# Patient Record
Sex: FEMALE | Race: BLACK OR AFRICAN AMERICAN | Employment: FULL TIME | URBAN - METROPOLITAN AREA
[De-identification: names, ages, dates, MRNs, and addresses within clinical notes are randomized per-mention and may not be internally consistent; named-entity substitution may affect disease eponyms.]

---

## 2016-05-11 LAB
ALT SERPL-CCNC: 22 U/L (ref 0–69)
CREAT SERPL-MCNC: 0.39 MG/DL (ref 0.52–1.04)
GFR SERPL CREATININE-BSD FRML MDRD: >60 ML/MIN/1.73M2
GLUCOSE SERPL-MCNC: 228 MG/DL (ref 70–180)
HBA1C MFR BLD: 9.8 % (ref 4.3–5.6)
POTASSIUM SERPL-SCNC: 3.9 MMOL/L (ref 3.5–5.3)

## 2017-09-20 ENCOUNTER — TRANSFERRED RECORDS (OUTPATIENT)
Dept: HEALTH INFORMATION MANAGEMENT | Facility: CLINIC | Age: 32
End: 2017-09-20

## 2017-09-20 LAB
CREAT SERPL-MCNC: 0.46 MG/DL (ref 0.55–1.02)
GFR SERPL CREATININE-BSD FRML MDRD: >60 ML/MIN/1.73M2
HBA1C MFR BLD: 9.9 % (ref 4.3–5.6)
POTASSIUM SERPL-SCNC: 3.7 MMOL/L (ref 3.5–5.1)
TSH SERPL-ACNC: 23.82 UIU/ML (ref 0.3–4.5)

## 2019-12-05 ENCOUNTER — APPOINTMENT (OUTPATIENT)
Dept: CT IMAGING | Facility: CLINIC | Age: 34
DRG: 639 | End: 2019-12-05
Attending: EMERGENCY MEDICINE
Payer: COMMERCIAL

## 2019-12-05 ENCOUNTER — APPOINTMENT (OUTPATIENT)
Dept: ULTRASOUND IMAGING | Facility: CLINIC | Age: 34
DRG: 639 | End: 2019-12-05
Attending: EMERGENCY MEDICINE
Payer: COMMERCIAL

## 2019-12-05 ENCOUNTER — APPOINTMENT (OUTPATIENT)
Dept: GENERAL RADIOLOGY | Facility: CLINIC | Age: 34
DRG: 639 | End: 2019-12-05
Attending: EMERGENCY MEDICINE
Payer: COMMERCIAL

## 2019-12-05 ENCOUNTER — HOSPITAL ENCOUNTER (INPATIENT)
Facility: CLINIC | Age: 34
LOS: 3 days | Discharge: HOME | DRG: 639 | End: 2019-12-08
Attending: EMERGENCY MEDICINE | Admitting: INTERNAL MEDICINE
Payer: COMMERCIAL

## 2019-12-05 DIAGNOSIS — E86.0 DEHYDRATION: ICD-10-CM

## 2019-12-05 DIAGNOSIS — R16.0 LIVER MASS: ICD-10-CM

## 2019-12-05 DIAGNOSIS — R11.2 NON-INTRACTABLE VOMITING WITH NAUSEA, UNSPECIFIED VOMITING TYPE: ICD-10-CM

## 2019-12-05 DIAGNOSIS — E87.20 LACTIC ACIDOSIS: ICD-10-CM

## 2019-12-05 DIAGNOSIS — E03.9 HYPOTHYROIDISM, UNSPECIFIED TYPE: Primary | ICD-10-CM

## 2019-12-05 DIAGNOSIS — R10.11 RUQ ABDOMINAL PAIN: ICD-10-CM

## 2019-12-05 LAB
ALBUMIN SERPL-MCNC: 2.9 G/DL (ref 3.4–5)
ALBUMIN UR-MCNC: 100 MG/DL
ALP SERPL-CCNC: 68 U/L (ref 40–150)
ALT SERPL W P-5'-P-CCNC: 12 U/L (ref 0–50)
ANION GAP SERPL CALCULATED.3IONS-SCNC: 10 MMOL/L (ref 3–14)
ANION GAP SERPL CALCULATED.3IONS-SCNC: 10 MMOL/L (ref 6–17)
ANION GAP SERPL CALCULATED.3IONS-SCNC: 9 MMOL/L (ref 3–14)
ANION GAP SERPL CALCULATED.3IONS-SCNC: 9 MMOL/L (ref 3–14)
APPEARANCE UR: CLEAR
AST SERPL W P-5'-P-CCNC: 10 U/L (ref 0–45)
B-HCG FREE SERPL-ACNC: <5 IU/L
BASE DEFICIT BLDV-SCNC: 6.6 MMOL/L
BASE DEFICIT BLDV-SCNC: 8.3 MMOL/L
BASOPHILS # BLD AUTO: 0.1 10E9/L (ref 0–0.2)
BASOPHILS NFR BLD AUTO: 0.6 %
BILIRUB DIRECT SERPL-MCNC: <0.1 MG/DL (ref 0–0.2)
BILIRUB SERPL-MCNC: 0.2 MG/DL (ref 0.2–1.3)
BILIRUB UR QL STRIP: NEGATIVE
BUN SERPL-MCNC: 13 MG/DL (ref 7–30)
BUN SERPL-MCNC: 14 MG/DL (ref 5–24)
BUN SERPL-MCNC: 14 MG/DL (ref 7–30)
BUN SERPL-MCNC: 17 MG/DL (ref 7–30)
CA-I BLD-SCNC: 4.4 MG/DL (ref 4.4–5.2)
CALCIUM SERPL-MCNC: 7.9 MG/DL (ref 8.5–10.1)
CALCIUM SERPL-MCNC: 8 MG/DL (ref 8.5–10.1)
CALCIUM SERPL-MCNC: 8.7 MG/DL (ref 8.5–10.1)
CHLORIDE BLD-SCNC: 107 MMOL/L (ref 94–109)
CHLORIDE SERPL-SCNC: 104 MMOL/L (ref 94–109)
CHLORIDE SERPL-SCNC: 108 MMOL/L (ref 94–109)
CHLORIDE SERPL-SCNC: 108 MMOL/L (ref 94–109)
CO2 BLD-SCNC: 18 MMOL/L (ref 20–32)
CO2 BLDCOV-SCNC: 18 MMOL/L (ref 21–28)
CO2 SERPL-SCNC: 19 MMOL/L (ref 20–32)
CO2 SERPL-SCNC: 20 MMOL/L (ref 20–32)
COLOR UR AUTO: YELLOW
CREAT BLD-MCNC: 0.5 MG/DL (ref 0.52–1.04)
CREAT SERPL-MCNC: 0.53 MG/DL (ref 0.52–1.04)
CREAT SERPL-MCNC: 0.58 MG/DL (ref 0.52–1.04)
CREAT SERPL-MCNC: 0.66 MG/DL (ref 0.52–1.04)
DIFFERENTIAL METHOD BLD: ABNORMAL
EOSINOPHIL # BLD AUTO: 0 10E9/L (ref 0–0.7)
EOSINOPHIL NFR BLD AUTO: 0.3 %
ERYTHROCYTE [DISTWIDTH] IN BLOOD BY AUTOMATED COUNT: 16.9 % (ref 10–15)
GFR SERPL CREATININE-BSD FRML MDRD: >90 ML/MIN/{1.73_M2}
GLUCOSE BLD-MCNC: 240 MG/DL (ref 70–99)
GLUCOSE BLDC GLUCOMTR-MCNC: 125 MG/DL (ref 70–99)
GLUCOSE BLDC GLUCOMTR-MCNC: 171 MG/DL (ref 70–99)
GLUCOSE BLDC GLUCOMTR-MCNC: 185 MG/DL (ref 70–99)
GLUCOSE BLDC GLUCOMTR-MCNC: 290 MG/DL (ref 70–99)
GLUCOSE SERPL-MCNC: 158 MG/DL (ref 70–99)
GLUCOSE SERPL-MCNC: 190 MG/DL (ref 70–99)
GLUCOSE SERPL-MCNC: 304 MG/DL (ref 70–99)
GLUCOSE UR STRIP-MCNC: >1000 MG/DL
HBA1C MFR BLD: 9.6 % (ref 0–5.6)
HCO3 BLDV-SCNC: 18 MMOL/L (ref 21–28)
HCO3 BLDV-SCNC: 19 MMOL/L (ref 21–28)
HCT VFR BLD AUTO: 32.2 % (ref 35–47)
HCT VFR BLD CALC: 29 %PCV (ref 35–47)
HGB BLD CALC-MCNC: 9.9 G/DL (ref 11.7–15.7)
HGB BLD-MCNC: 9.2 G/DL (ref 11.7–15.7)
HGB UR QL STRIP: NEGATIVE
HYALINE CASTS #/AREA URNS LPF: 3 /LPF (ref 0–2)
IMM GRANULOCYTES # BLD: 0.1 10E9/L (ref 0–0.4)
IMM GRANULOCYTES NFR BLD: 0.6 %
KETONES BLD-SCNC: 0.9 MMOL/L (ref 0–0.6)
KETONES BLD-SCNC: 1.4 MMOL/L (ref 0–0.6)
KETONES UR STRIP-MCNC: 80 MG/DL
LACTATE BLD-SCNC: 2.6 MMOL/L (ref 0.7–2.1)
LEUKOCYTE ESTERASE UR QL STRIP: NEGATIVE
LIPASE SERPL-CCNC: 187 U/L (ref 73–393)
LYMPHOCYTES # BLD AUTO: 1.9 10E9/L (ref 0.8–5.3)
LYMPHOCYTES NFR BLD AUTO: 12.7 %
MAGNESIUM SERPL-MCNC: 1.3 MG/DL (ref 1.6–2.3)
MCH RBC QN AUTO: 19.5 PG (ref 26.5–33)
MCHC RBC AUTO-ENTMCNC: 28.6 G/DL (ref 31.5–36.5)
MCV RBC AUTO: 68 FL (ref 78–100)
MONOCYTES # BLD AUTO: 0.4 10E9/L (ref 0–1.3)
MONOCYTES NFR BLD AUTO: 2.4 %
MUCOUS THREADS #/AREA URNS LPF: PRESENT /LPF
NEUTROPHILS # BLD AUTO: 12.3 10E9/L (ref 1.6–8.3)
NEUTROPHILS NFR BLD AUTO: 83.4 %
NITRATE UR QL: NEGATIVE
NRBC # BLD AUTO: 0 10*3/UL
NRBC BLD AUTO-RTO: 0 /100
O2/TOTAL GAS SETTING VFR VENT: ABNORMAL %
O2/TOTAL GAS SETTING VFR VENT: ABNORMAL %
OSMOLALITY SERPL: 296 MMOL/KG (ref 275–295)
OXYHGB MFR BLDV: 59 %
OXYHGB MFR BLDV: 70 %
PCO2 BLDV: 39 MM HG (ref 40–50)
PCO2 BLDV: 40 MM HG (ref 40–50)
PCO2 BLDV: 40 MM HG (ref 40–50)
PH BLDV: 7.26 PH (ref 7.32–7.43)
PH BLDV: 7.28 PH (ref 7.32–7.43)
PH BLDV: 7.3 PH (ref 7.32–7.43)
PH UR STRIP: 6 PH (ref 5–7)
PHOSPHATE SERPL-MCNC: 3.2 MG/DL (ref 2.5–4.5)
PHOSPHATE SERPL-MCNC: 3.7 MG/DL (ref 2.5–4.5)
PLATELET # BLD AUTO: 505 10E9/L (ref 150–450)
PO2 BLDV: 25 MM HG (ref 25–47)
PO2 BLDV: 39 MM HG (ref 25–47)
PO2 BLDV: 44 MM HG (ref 25–47)
POTASSIUM BLD-SCNC: 4.3 MMOL/L (ref 3.4–5.3)
POTASSIUM SERPL-SCNC: 3.7 MMOL/L (ref 3.4–5.3)
POTASSIUM SERPL-SCNC: 4.1 MMOL/L (ref 3.4–5.3)
POTASSIUM SERPL-SCNC: 4.1 MMOL/L (ref 3.4–5.3)
PROT SERPL-MCNC: 7.6 G/DL (ref 6.8–8.8)
RBC # BLD AUTO: 4.71 10E12/L (ref 3.8–5.2)
RBC #/AREA URNS AUTO: 1 /HPF (ref 0–2)
SAO2 % BLDV FROM PO2: 38 %
SODIUM BLD-SCNC: 135 MMOL/L (ref 133–144)
SODIUM SERPL-SCNC: 133 MMOL/L (ref 133–144)
SODIUM SERPL-SCNC: 136 MMOL/L (ref 133–144)
SODIUM SERPL-SCNC: 137 MMOL/L (ref 133–144)
SOURCE: ABNORMAL
SP GR UR STRIP: 1.03 (ref 1–1.03)
SQUAMOUS #/AREA URNS AUTO: 2 /HPF (ref 0–1)
T4 FREE SERPL-MCNC: 1.47 NG/DL (ref 0.76–1.46)
TSH SERPL DL<=0.005 MIU/L-ACNC: 0.03 MU/L (ref 0.4–4)
UROBILINOGEN UR STRIP-MCNC: NORMAL MG/DL (ref 0–2)
WBC # BLD AUTO: 14.7 10E9/L (ref 4–11)
WBC #/AREA URNS AUTO: 3 /HPF (ref 0–5)

## 2019-12-05 PROCEDURE — 80048 BASIC METABOLIC PNL TOTAL CA: CPT | Performed by: INTERNAL MEDICINE

## 2019-12-05 PROCEDURE — 00000146 ZZHCL STATISTIC GLUCOSE BY METER IP

## 2019-12-05 PROCEDURE — 12000000 ZZH R&B MED SURG/OB

## 2019-12-05 PROCEDURE — 81001 URINALYSIS AUTO W/SCOPE: CPT | Performed by: EMERGENCY MEDICINE

## 2019-12-05 PROCEDURE — 71046 X-RAY EXAM CHEST 2 VIEWS: CPT

## 2019-12-05 PROCEDURE — 84100 ASSAY OF PHOSPHORUS: CPT | Performed by: INTERNAL MEDICINE

## 2019-12-05 PROCEDURE — 87040 BLOOD CULTURE FOR BACTERIA: CPT | Performed by: PHYSICIAN ASSISTANT

## 2019-12-05 PROCEDURE — 84702 CHORIONIC GONADOTROPIN TEST: CPT

## 2019-12-05 PROCEDURE — 36415 COLL VENOUS BLD VENIPUNCTURE: CPT | Performed by: INTERNAL MEDICINE

## 2019-12-05 PROCEDURE — 82805 BLOOD GASES W/O2 SATURATION: CPT | Performed by: EMERGENCY MEDICINE

## 2019-12-05 PROCEDURE — 25800030 ZZH RX IP 258 OP 636: Performed by: EMERGENCY MEDICINE

## 2019-12-05 PROCEDURE — 25000131 ZZH RX MED GY IP 250 OP 636 PS 637: Performed by: INTERNAL MEDICINE

## 2019-12-05 PROCEDURE — 83930 ASSAY OF BLOOD OSMOLALITY: CPT | Performed by: EMERGENCY MEDICINE

## 2019-12-05 PROCEDURE — 96376 TX/PRO/DX INJ SAME DRUG ADON: CPT

## 2019-12-05 PROCEDURE — 76705 ECHO EXAM OF ABDOMEN: CPT

## 2019-12-05 PROCEDURE — 36415 COLL VENOUS BLD VENIPUNCTURE: CPT | Performed by: PHYSICIAN ASSISTANT

## 2019-12-05 PROCEDURE — 83036 HEMOGLOBIN GLYCOSYLATED A1C: CPT | Performed by: EMERGENCY MEDICINE

## 2019-12-05 PROCEDURE — 80047 BASIC METABLC PNL IONIZED CA: CPT

## 2019-12-05 PROCEDURE — 99285 EMERGENCY DEPT VISIT HI MDM: CPT | Mod: 25

## 2019-12-05 PROCEDURE — 99223 1ST HOSP IP/OBS HIGH 75: CPT | Mod: AI | Performed by: PHYSICIAN ASSISTANT

## 2019-12-05 PROCEDURE — 80048 BASIC METABOLIC PNL TOTAL CA: CPT | Performed by: EMERGENCY MEDICINE

## 2019-12-05 PROCEDURE — 83735 ASSAY OF MAGNESIUM: CPT | Performed by: EMERGENCY MEDICINE

## 2019-12-05 PROCEDURE — 82010 KETONE BODYS QUAN: CPT | Performed by: PHYSICIAN ASSISTANT

## 2019-12-05 PROCEDURE — 25000132 ZZH RX MED GY IP 250 OP 250 PS 637: Performed by: PHYSICIAN ASSISTANT

## 2019-12-05 PROCEDURE — 84100 ASSAY OF PHOSPHORUS: CPT | Performed by: EMERGENCY MEDICINE

## 2019-12-05 PROCEDURE — 85025 COMPLETE CBC W/AUTO DIFF WBC: CPT | Performed by: EMERGENCY MEDICINE

## 2019-12-05 PROCEDURE — 96361 HYDRATE IV INFUSION ADD-ON: CPT

## 2019-12-05 PROCEDURE — 80076 HEPATIC FUNCTION PANEL: CPT | Performed by: EMERGENCY MEDICINE

## 2019-12-05 PROCEDURE — 25000128 H RX IP 250 OP 636: Performed by: EMERGENCY MEDICINE

## 2019-12-05 PROCEDURE — 84443 ASSAY THYROID STIM HORMONE: CPT | Performed by: PHYSICIAN ASSISTANT

## 2019-12-05 PROCEDURE — 82010 KETONE BODYS QUAN: CPT | Performed by: EMERGENCY MEDICINE

## 2019-12-05 PROCEDURE — 84443 ASSAY THYROID STIM HORMONE: CPT | Performed by: EMERGENCY MEDICINE

## 2019-12-05 PROCEDURE — 82374 ASSAY BLOOD CARBON DIOXIDE: CPT | Performed by: PHYSICIAN ASSISTANT

## 2019-12-05 PROCEDURE — 25000128 H RX IP 250 OP 636: Performed by: PHYSICIAN ASSISTANT

## 2019-12-05 PROCEDURE — 83605 ASSAY OF LACTIC ACID: CPT

## 2019-12-05 PROCEDURE — 74176 CT ABD & PELVIS W/O CONTRAST: CPT

## 2019-12-05 PROCEDURE — 85014 HEMATOCRIT: CPT

## 2019-12-05 PROCEDURE — 82803 BLOOD GASES ANY COMBINATION: CPT

## 2019-12-05 PROCEDURE — 36415 COLL VENOUS BLD VENIPUNCTURE: CPT | Performed by: EMERGENCY MEDICINE

## 2019-12-05 PROCEDURE — 96374 THER/PROPH/DIAG INJ IV PUSH: CPT

## 2019-12-05 PROCEDURE — 83690 ASSAY OF LIPASE: CPT | Performed by: EMERGENCY MEDICINE

## 2019-12-05 PROCEDURE — 84439 ASSAY OF FREE THYROXINE: CPT | Performed by: EMERGENCY MEDICINE

## 2019-12-05 PROCEDURE — 25000128 H RX IP 250 OP 636: Performed by: INTERNAL MEDICINE

## 2019-12-05 RX ORDER — BISACODYL 10 MG
10 SUPPOSITORY, RECTAL RECTAL DAILY PRN
Status: DISCONTINUED | OUTPATIENT
Start: 2019-12-05 | End: 2019-12-08 | Stop reason: HOSPADM

## 2019-12-05 RX ORDER — AMOXICILLIN 250 MG
2 CAPSULE ORAL 2 TIMES DAILY PRN
Status: DISCONTINUED | OUTPATIENT
Start: 2019-12-05 | End: 2019-12-08 | Stop reason: HOSPADM

## 2019-12-05 RX ORDER — LEVOTHYROXINE SODIUM 150 UG/1
150 TABLET ORAL DAILY
Status: ON HOLD | COMMUNITY
Start: 2019-10-04 | End: 2019-12-08

## 2019-12-05 RX ORDER — ACETAMINOPHEN 325 MG/1
650 TABLET ORAL EVERY 4 HOURS PRN
Status: DISCONTINUED | OUTPATIENT
Start: 2019-12-05 | End: 2019-12-08 | Stop reason: HOSPADM

## 2019-12-05 RX ORDER — NALOXONE HYDROCHLORIDE 0.4 MG/ML
.1-.4 INJECTION, SOLUTION INTRAMUSCULAR; INTRAVENOUS; SUBCUTANEOUS
Status: DISCONTINUED | OUTPATIENT
Start: 2019-12-05 | End: 2019-12-08 | Stop reason: HOSPADM

## 2019-12-05 RX ORDER — ONDANSETRON 2 MG/ML
4-8 INJECTION INTRAMUSCULAR; INTRAVENOUS EVERY 8 HOURS PRN
Status: DISCONTINUED | OUTPATIENT
Start: 2019-12-05 | End: 2019-12-08 | Stop reason: HOSPADM

## 2019-12-05 RX ORDER — DEXTROSE MONOHYDRATE, SODIUM CHLORIDE, AND POTASSIUM CHLORIDE 50; 1.49; 4.5 G/1000ML; G/1000ML; G/1000ML
INJECTION, SOLUTION INTRAVENOUS CONTINUOUS
Status: DISCONTINUED | OUTPATIENT
Start: 2019-12-05 | End: 2019-12-05

## 2019-12-05 RX ORDER — ALUMINA, MAGNESIA, AND SIMETHICONE 2400; 2400; 240 MG/30ML; MG/30ML; MG/30ML
30 SUSPENSION ORAL EVERY 4 HOURS PRN
Status: DISCONTINUED | OUTPATIENT
Start: 2019-12-05 | End: 2019-12-08 | Stop reason: HOSPADM

## 2019-12-05 RX ORDER — POTASSIUM CHLORIDE 29.8 MG/ML
20 INJECTION INTRAVENOUS
Status: DISCONTINUED | OUTPATIENT
Start: 2019-12-05 | End: 2019-12-05

## 2019-12-05 RX ORDER — SODIUM CHLORIDE AND POTASSIUM CHLORIDE 150; 900 MG/100ML; MG/100ML
INJECTION, SOLUTION INTRAVENOUS CONTINUOUS
Status: DISCONTINUED | OUTPATIENT
Start: 2019-12-05 | End: 2019-12-05

## 2019-12-05 RX ORDER — NICOTINE POLACRILEX 4 MG
15-30 LOZENGE BUCCAL
Status: DISCONTINUED | OUTPATIENT
Start: 2019-12-05 | End: 2019-12-08 | Stop reason: HOSPADM

## 2019-12-05 RX ORDER — HYDROMORPHONE HYDROCHLORIDE 1 MG/ML
.3-.5 INJECTION, SOLUTION INTRAMUSCULAR; INTRAVENOUS; SUBCUTANEOUS
Status: DISCONTINUED | OUTPATIENT
Start: 2019-12-05 | End: 2019-12-06

## 2019-12-05 RX ORDER — POTASSIUM CHLORIDE 1.5 G/1.58G
20-40 POWDER, FOR SOLUTION ORAL
Status: DISCONTINUED | OUTPATIENT
Start: 2019-12-05 | End: 2019-12-08 | Stop reason: HOSPADM

## 2019-12-05 RX ORDER — LEVOTHYROXINE SODIUM 75 UG/1
150 TABLET ORAL DAILY
Status: DISCONTINUED | OUTPATIENT
Start: 2019-12-05 | End: 2019-12-07

## 2019-12-05 RX ORDER — PROCHLORPERAZINE MALEATE 5 MG
10 TABLET ORAL EVERY 6 HOURS PRN
Status: DISCONTINUED | OUTPATIENT
Start: 2019-12-05 | End: 2019-12-08 | Stop reason: HOSPADM

## 2019-12-05 RX ORDER — PROCHLORPERAZINE 25 MG
25 SUPPOSITORY, RECTAL RECTAL EVERY 12 HOURS PRN
Status: DISCONTINUED | OUTPATIENT
Start: 2019-12-05 | End: 2019-12-08 | Stop reason: HOSPADM

## 2019-12-05 RX ORDER — IBUPROFEN 600 MG/1
600 TABLET, FILM COATED ORAL EVERY 6 HOURS PRN
Status: DISCONTINUED | OUTPATIENT
Start: 2019-12-05 | End: 2019-12-08 | Stop reason: HOSPADM

## 2019-12-05 RX ORDER — DROSPIRENONE AND ETHINYL ESTRADIOL 0.02-3(28)
1 KIT ORAL DAILY
COMMUNITY
Start: 2019-07-22

## 2019-12-05 RX ORDER — POTASSIUM CL/LIDO/0.9 % NACL 10MEQ/0.1L
10 INTRAVENOUS SOLUTION, PIGGYBACK (ML) INTRAVENOUS
Status: DISCONTINUED | OUTPATIENT
Start: 2019-12-05 | End: 2019-12-08 | Stop reason: HOSPADM

## 2019-12-05 RX ORDER — SODIUM CHLORIDE, SODIUM LACTATE, POTASSIUM CHLORIDE, CALCIUM CHLORIDE 600; 310; 30; 20 MG/100ML; MG/100ML; MG/100ML; MG/100ML
1000 INJECTION, SOLUTION INTRAVENOUS CONTINUOUS
Status: DISCONTINUED | OUTPATIENT
Start: 2019-12-05 | End: 2019-12-05

## 2019-12-05 RX ORDER — POTASSIUM CHLORIDE 1500 MG/1
20-40 TABLET, EXTENDED RELEASE ORAL
Status: DISCONTINUED | OUTPATIENT
Start: 2019-12-05 | End: 2019-12-08 | Stop reason: HOSPADM

## 2019-12-05 RX ORDER — DEXTROSE MONOHYDRATE 25 G/50ML
25-50 INJECTION, SOLUTION INTRAVENOUS
Status: DISCONTINUED | OUTPATIENT
Start: 2019-12-05 | End: 2019-12-08 | Stop reason: HOSPADM

## 2019-12-05 RX ORDER — HYDROMORPHONE HYDROCHLORIDE 1 MG/ML
0.5 INJECTION, SOLUTION INTRAMUSCULAR; INTRAVENOUS; SUBCUTANEOUS ONCE
Status: COMPLETED | OUTPATIENT
Start: 2019-12-05 | End: 2019-12-05

## 2019-12-05 RX ORDER — SODIUM CHLORIDE 9 MG/ML
INJECTION, SOLUTION INTRAVENOUS CONTINUOUS
Status: DISCONTINUED | OUTPATIENT
Start: 2019-12-05 | End: 2019-12-07

## 2019-12-05 RX ORDER — POTASSIUM CHLORIDE 7.45 MG/ML
10 INJECTION INTRAVENOUS
Status: DISCONTINUED | OUTPATIENT
Start: 2019-12-05 | End: 2019-12-08 | Stop reason: HOSPADM

## 2019-12-05 RX ORDER — ONDANSETRON 4 MG/1
4-8 TABLET, ORALLY DISINTEGRATING ORAL EVERY 8 HOURS PRN
Status: DISCONTINUED | OUTPATIENT
Start: 2019-12-05 | End: 2019-12-08 | Stop reason: HOSPADM

## 2019-12-05 RX ORDER — HYDROMORPHONE HYDROCHLORIDE 1 MG/ML
0.5 INJECTION, SOLUTION INTRAMUSCULAR; INTRAVENOUS; SUBCUTANEOUS
Status: COMPLETED | OUTPATIENT
Start: 2019-12-05 | End: 2019-12-05

## 2019-12-05 RX ORDER — GLIPIZIDE 10 MG/1
10 TABLET, FILM COATED, EXTENDED RELEASE ORAL DAILY
COMMUNITY
Start: 2019-10-15 | End: 2020-10-14

## 2019-12-05 RX ORDER — LISINOPRIL 5 MG/1
5 TABLET ORAL DAILY
COMMUNITY

## 2019-12-05 RX ORDER — SODIUM CHLORIDE 9 MG/ML
1000 INJECTION, SOLUTION INTRAVENOUS CONTINUOUS
Status: DISCONTINUED | OUTPATIENT
Start: 2019-12-05 | End: 2019-12-05

## 2019-12-05 RX ORDER — AMOXICILLIN 250 MG
1 CAPSULE ORAL 2 TIMES DAILY PRN
Status: DISCONTINUED | OUTPATIENT
Start: 2019-12-05 | End: 2019-12-08 | Stop reason: HOSPADM

## 2019-12-05 RX ORDER — LANOLIN ALCOHOL/MO/W.PET/CERES
3 CREAM (GRAM) TOPICAL
Status: DISCONTINUED | OUTPATIENT
Start: 2019-12-05 | End: 2019-12-08 | Stop reason: HOSPADM

## 2019-12-05 RX ORDER — MAGNESIUM SULFATE HEPTAHYDRATE 40 MG/ML
4 INJECTION, SOLUTION INTRAVENOUS EVERY 4 HOURS PRN
Status: DISCONTINUED | OUTPATIENT
Start: 2019-12-05 | End: 2019-12-08 | Stop reason: HOSPADM

## 2019-12-05 RX ORDER — LIDOCAINE 40 MG/G
CREAM TOPICAL
Status: DISCONTINUED | OUTPATIENT
Start: 2019-12-05 | End: 2019-12-08 | Stop reason: HOSPADM

## 2019-12-05 RX ADMIN — HYDROMORPHONE HYDROCHLORIDE 0.5 MG: 1 INJECTION, SOLUTION INTRAMUSCULAR; INTRAVENOUS; SUBCUTANEOUS at 10:31

## 2019-12-05 RX ADMIN — HYDROMORPHONE HYDROCHLORIDE 0.5 MG: 1 INJECTION, SOLUTION INTRAMUSCULAR; INTRAVENOUS; SUBCUTANEOUS at 13:10

## 2019-12-05 RX ADMIN — SODIUM CHLORIDE, POTASSIUM CHLORIDE, SODIUM LACTATE AND CALCIUM CHLORIDE 1000 ML: 600; 310; 30; 20 INJECTION, SOLUTION INTRAVENOUS at 15:15

## 2019-12-05 RX ADMIN — HYDROMORPHONE HYDROCHLORIDE 0.5 MG: 1 INJECTION, SOLUTION INTRAMUSCULAR; INTRAVENOUS; SUBCUTANEOUS at 15:21

## 2019-12-05 RX ADMIN — SODIUM CHLORIDE 1000 ML: 9 INJECTION, SOLUTION INTRAVENOUS at 10:30

## 2019-12-05 RX ADMIN — INSULIN ASPART 2 UNITS: 100 INJECTION, SOLUTION INTRAVENOUS; SUBCUTANEOUS at 18:47

## 2019-12-05 RX ADMIN — ONDANSETRON 8 MG: 4 TABLET, ORALLY DISINTEGRATING ORAL at 17:31

## 2019-12-05 RX ADMIN — LEVOTHYROXINE SODIUM 150 MCG: 75 TABLET ORAL at 17:30

## 2019-12-05 RX ADMIN — HYDROMORPHONE HYDROCHLORIDE 0.5 MG: 1 INJECTION, SOLUTION INTRAMUSCULAR; INTRAVENOUS; SUBCUTANEOUS at 11:33

## 2019-12-05 RX ADMIN — ENOXAPARIN SODIUM 40 MG: 40 INJECTION SUBCUTANEOUS at 17:30

## 2019-12-05 RX ADMIN — MAGNESIUM SULFATE HEPTAHYDRATE 4 G: 40 INJECTION, SOLUTION INTRAVENOUS at 18:40

## 2019-12-05 RX ADMIN — SODIUM CHLORIDE, POTASSIUM CHLORIDE, SODIUM LACTATE AND CALCIUM CHLORIDE 1000 ML: 600; 310; 30; 20 INJECTION, SOLUTION INTRAVENOUS at 12:42

## 2019-12-05 RX ADMIN — HYDROMORPHONE HYDROCHLORIDE 0.5 MG: 1 INJECTION, SOLUTION INTRAMUSCULAR; INTRAVENOUS; SUBCUTANEOUS at 17:31

## 2019-12-05 ASSESSMENT — ENCOUNTER SYMPTOMS
DIFFICULTY URINATING: 0
FEVER: 0
NAUSEA: 1
BACK PAIN: 1
DYSURIA: 0
FREQUENCY: 0
ROS GI COMMENTS: HEMATEMESIS
ABDOMINAL PAIN: 1
SHORTNESS OF BREATH: 0
HEMATURIA: 0
DIARRHEA: 0
VOMITING: 1

## 2019-12-05 ASSESSMENT — MIFFLIN-ST. JEOR: SCORE: 1238.37

## 2019-12-05 ASSESSMENT — ACTIVITIES OF DAILY LIVING (ADL): ADLS_ACUITY_SCORE: 11

## 2019-12-05 NOTE — PROVIDER NOTIFICATION
"Text page MD: \"BGL is 185, do we still need to start the insulin gtt? Also, Magnesium is 1.3, do you want us to replace?\"    Text page MD: \"BGL is 171, do we still need to start the insulin gtt? Also, Magnesium is 1.3, do you want us to replace?\"      MD Response: Begin sliding scale insulin. Start NS infusion. BMP lab collect timed once at 1900 and AM.  "

## 2019-12-05 NOTE — ED NOTES
Bed: ED28  Expected date: 12/5/19  Expected time: 9:33 AM  Means of arrival: Ambulance  Comments:  [id number]  33yo F abd pain

## 2019-12-05 NOTE — ED NOTES
[facility]  ED Nurse Handoff Report    Robert Wallace is a 34 year old female   ED Chief complaint: Abdominal Pain  . ED Diagnosis:   Final diagnoses:   RUQ abdominal pain   Liver mass   Non-intractable vomiting with nausea, unspecified vomiting type     Allergies: No Known Allergies    Code Status: Full Code  Activity level - Baseline/Home:  Independent. Activity Level - Current:   Stand by Assist. Lift room needed: No. Bariatric: No   Needed: No   Isolation: No. Infection: Not Applicable.     Vital Signs:   Vitals:    12/05/19 1448 12/05/19 1500 12/05/19 1513 12/05/19 1515   BP:  (!) 164/116  (!) 182/122   Pulse:  89  88   Resp:       Temp:   98.3  F (36.8  C)    TempSrc:   Oral    SpO2:  100%  97%   Weight: 61.8 kg (136 lb 3.9 oz)          Cardiac Rhythm:  ,      Pain level: 0-10 Pain Scale: 9  Patient confused: No. Patient Falls Risk: Yes.   Elimination Status: Has voided   Patient Report - Initial Complaint: abd pain. Focused Assessment: Gastrointestinal - GI WDL: all  Abdominal Palpation: RLQ (radiating to back) Nausea/Vomiting Signs/Symptoms: nausea continuous; emesis  GI Signs/Symptoms: abdominal pain   Tests Performed: labs/imaging. Abnormal Results:   Labs Ordered and Resulted from Time of ED Arrival Up to the Time of Departure from the ED   CBC WITH PLATELETS DIFFERENTIAL - Abnormal; Notable for the following components:       Result Value    WBC 14.7 (*)     Hemoglobin 9.2 (*)     Hematocrit 32.2 (*)     MCV 68 (*)     MCH 19.5 (*)     MCHC 28.6 (*)     RDW 16.9 (*)     Platelet Count 505 (*)     Absolute Neutrophil 12.3 (*)     All other components within normal limits   BASIC METABOLIC PANEL - Abnormal; Notable for the following components:    Carbon Dioxide 19 (*)     Glucose 304 (*)     All other components within normal limits   GLUCOSE BY METER - Abnormal; Notable for the following components:    Glucose 290 (*)     All other components within normal limits   ROUTINE UA  WITH MICROSCOPIC - Abnormal; Notable for the following components:    Glucose Urine >1000 (*)     Ketones Urine 80 (*)     Protein Albumin Urine 100 (*)     Squamous Epithelial /HPF Urine 2 (*)     Mucous Urine Present (*)     Hyaline Casts 3 (*)     All other components within normal limits   HEPATIC PANEL - Abnormal; Notable for the following components:    Albumin 2.9 (*)     All other components within normal limits   KETONE BETA-HYDROXYBUTYRATE QUANTITATIVE - Abnormal; Notable for the following components:    Ketone Quantitative 1.4 (*)     All other components within normal limits   BLOOD GAS VENOUS AND OXYHGB - Abnormal; Notable for the following components:    Ph Venous 7.26 (*)     Bicarbonate Venous 18 (*)     All other components within normal limits   ISTAT BASIC MET ICA HCT POCT - Abnormal; Notable for the following components:    Total CO2 18 (*)     Glucose 240 (*)     Creatinine 0.5 (*)     Hemoglobin 9.9 (*)     Hematocrit - POCT 29 (*)     All other components within normal limits   ISTAT  GASES LACTATE NANCY POCT - Abnormal; Notable for the following components:    Ph Venous 7.28 (*)     PCO2 Venous 39 (*)     Bicarbonate Venous 18 (*)     Lactic Acid 2.6 (*)     All other components within normal limits   LIPASE   GLUCOSE MONITOR NURSING POCT   ISTAT HCG QUANTITATIVE PREGNANCY NURSING POCT   PULSE OXIMETRY NURSING   STRAIN URINE   PERIPHERAL IV CATHETER   IV ACCESS   ISTAT GAS OR ELECTROLYTE NURSING POCT   ISTAT CG4 GASES LACTATE NANCY NURSING POCT   ISTAT HCG QUANTITATIVE PREGNANCY POCT     .   Treatments provided: MAR  Family Comments: none present  OBS brochure/video discussed/provided to patient:  N/A  ED Medications:   Medications   0.9% sodium chloride BOLUS (0 mLs Intravenous Stopped 12/5/19 1241)     Followed by   sodium chloride 0.9% infusion (has no administration in time range)   lactated ringers infusion (1,000 mLs Intravenous New Bag 12/5/19 2955)   HYDROmorphone (PF) (DILAUDID)  injection 0.5 mg (0.5 mg Intravenous Given 12/5/19 1310)   lactated ringers BOLUS 1,000 mL (0 mLs Intravenous Stopped 12/5/19 1522)   HYDROmorphone (PF) (DILAUDID) injection 0.5 mg (0.5 mg Intravenous Given 12/5/19 1521)     Drips infusing:  Yes  For the majority of the shift, the patient's behavior Green. Interventions performed were .     Severe Sepsis OR Septic Shock Diagnosis Present: No    RECEIVING UNIT ED HANDOFF REVIEW    Above ED Nurse Handoff Report was reviewed: Yes  Reviewed by: Lexi Meek, RN on December 5, 2019 at 4:09 PM       ED Nurse Name/Phone Number: Tere Barrera RN,   3:29 PM    RECEIVING UNIT ED HANDOFF REVIEW    Above ED Nurse Handoff Report was reviewed: Yes  Reviewed by: Lakhwinder Lara, LIYAH on December 5, 2019 at 4:40 PM

## 2019-12-05 NOTE — ED TRIAGE NOTES
Pt presents via EMS for having an acute onset of of right lower abd pain that goes into her back associated with N/V. Pt is A&O, ABC's intact. EMS adm Zofran 4mg and Toradol 15mg PTA.

## 2019-12-05 NOTE — PROGRESS NOTES
I have discussed plan with Dr Ayon and Salma Elam PAC. I have evaluated and examined patient. She presents with mild DKA in setting of prior hx of type 2 DM. Will get a BMP check post IVF's. Likely will be able to be admitted IMC with insulin gtt, IVF's. Her abdominal exam is benign at present.

## 2019-12-05 NOTE — H&P
[facility]  History and Physical  Hospitalist       Date of Admission:  12/5/2019    Assessment & Plan   Robert Wallace is a 34 year old female with type 2 diabetes mellitus, hypertension, chronic anemia, hypothyroidism, and history of hepatic adenoma who presented to [facility] on 12/5/2019 with sudden onset nausea/vomiting and abdominal pain and is found to have DKA.      Diabetic ketoacidosis  On presentation, corrected sodium about 135, potassium 4.1, CO2 19, and glucose 304 with a closed anion gap.  Serum ketone 1.4.  vBG with pH 7.26  pCO2 40  pO2 44  bicarb 18.  Lactic acid 2.6 after 2L fluid and CO2 remained at 18.    --Admit to [facility] for DKA protocol.  NS with 20 mEqK @ 200 mL/hr until glucose ~200 and then transition to D5 half normal @ 125 mL/hr.  Insulin protocol.    --Added on mag and phos.    --Add on HgbA1c or draw in AM.    --Check ketones and CO2 q2 hours to ensure appropriately correcting.    --Tele  --Strict IO     Abdominal pain, N/V  Suspect related to #1 although viral gastroenteritis also in differential.  Continue aggressive hydration, antiemetics, antispasmotics.  Hope sx will improve as DKA resolves.      Hypertension  PTA lisinopril on hold.  BP elevated in setting of pain.  PRN hydralazine available.     Hypothyroidism  PTA synthroid continued.  TFTs added on.      Hyperechoic liver mass  Mildly hyperechoic liver mass noted on the U/S.  May be related to patient's known hepatic adenoma.  MRI Liver recommended by Radiology if further characterization is needed.  Will hold off for now.  If ongoing pain despite correction of DKA, could consider pursuing this.     DVT Prophylaxis: Enoxaparin (Lovenox) SQ  Code Status: Full Code    Disposition: Expected discharge in 3-4 days once symptoms improving and sugars controlled .    TOTAL TIME SPENT:  Greater than 45 minutes spent on floor time including chart review, consultation and coordination of care.    Salma Elam PAC    PRIMARY  CARE PROVIDER: [facility]    CHIEF COMPLAINT  Abdominal pain, nausea/vomiting    History is obtained from the patient    HISTORY OF PRESENT ILLNESS  Robert Wallace is a 34 year old female with type 2 diabetes mellitus and hypertension [facility] with sudden onset nausea/vomiting and abdominal pain.  Nausea and vomiting began this morning around 730.  Pain began shortly thereafter and is quite intense and located in the right lower quadrant.  Due to ongoing symptoms, patient came in for evaluation.    Patient seen in the ED today by Dr. Ayon.  Blood pressure 148/98, heart rate 89, respiration 18, SPO2 100% on room air temperature 97.9.  Sodium 133, potassium 4.1, CO2 19, and glucose 304 with a closed anion gap.  LFTs within normal limits.  Lipase 27.  Serum ketone 1.4.  vBG with pH 7.26  pCO2 40  pO2 44  bicarb 18.  Lactic acid 2.6 after 2L fluid and CO2 remained at 18.  UA showed greater than 1000 glucose, 80 ketones, 100 protein, negative nitrite, negative blood, negative leukocyte esterase.  With fluids, glucose trended to 240 and vBG corrected to pH 7.28  pCO2 39  pO2 25  bicarb 18.  CT Abd/Pelv revealed no acute abnormalities to explain patient's symptoms (normal-appearing appendix, no hyperdense kidney stones, no hydronephrosis, no dilated loops of bowel).  RUQ U/S revealed mildly hyperechoic mass in the right hepatic lobe measuring 4.7 x 5.2 x 3.4 cm with minimal internal flow by Doppler but was otherwise negative.  CXR within normal limits.    Patient seen and examined in the ER where present time she continues to feel poorly.  She feels very dehydrated and has ongoing abdominal pain.  She has been unable to eat anything due to nausea.  She denies any recent fever, chills, sweats, shortness of breath, or chest pain.  No recent issues with diarrhea or constipation.  No increased thirst or urinary frequency.  No unusual rashes, bruising or bleeding, or  other symptoms.    PAST MEDICAL HISTORY  I have reviewed this patient's medical history and updated it with pertinent information if needed.   Past Medical History:   Diagnosis Date     Anemia, baseline 10-11.      Diabetes mellitus, type II, on oral medications.      Hepatic adenoma      Hypothyroidism      Migraine headache      Gestational diabetes in  and then formally diagnosed with diabetes when she moved to [place]. No prior issues with DKA.      Family history of early heart disease.  Brother  of MI age 39.    PAST SURGICAL HISTORY  I have reviewed this patient's surgical history and updated it with pertinent information if needed.  Past Surgical History:   Procedure Laterality Date      SECTION N/A 2016    Procedure:  SECTION;  Surgeon: Jennifer Heller MD;  Location:  OR     PRIOR TO ADMISSION MEDICATIONS  Prior to Admission Medications   Prescriptions Last Dose Informant Patient Reported? Taking?   FRANKLIN 3-0.02 MG tablet 2019 at am  Yes Yes   Sig: Take 1 tablet by mouth daily   glipiZIDE (GLUCOTROL XL) 10 MG 24 hr tablet 2019 at am  Yes Yes   Sig: Take 10 mg by mouth daily   ibuprofen (ADVIL,MOTRIN) 400 MG tablet Past Week at Unknown time  No Yes   Sig: Take 1-2 tablets (400-800 mg) by mouth every 6 hours as needed for other (cramping)   levothyroxine (SYNTHROID/LEVOTHROID) 150 MCG tablet 2019 at am  Yes Yes   Sig: Take 150 mcg by mouth daily   lisinopril (PRINIVIL/ZESTRIL) 5 MG tablet 2019 at am  Yes Yes   Sig: Take 5 mg by mouth daily   metFORMIN (GLUCOPHAGE) 1000 MG tablet 2019 at x 2  Yes Yes   Sig: Take 1,000 mg by mouth 2 times daily (with meals)   order for DME   No No   Sig: Equipment being ordered: [facility] Fax [phone]    Primary Dressing Endoform   Qty 10  Secondary Dressing 4x4 Gauze Loaf Qty  1  Length of Need: 1 month  Frequency of dressing change: daily      Facility-Administered Medications: None       ALLERGIES  No  Known Allergies    SOCIAL HISTORY   with 2 children.  Nonsmoker.  Rare alcohol.  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    FAMILY HISTORY  I have reviewed this patient's family history and updated it with pertinent information if needed.   Paternal side of family has strong cardiac history, in fact patient's brother  of MI age 39 earlier this year.  Maternal side of family has strong history of diabetes mellitus but no history of Type 1 diabetes.    REVIEW OF SYSTEMS:  14 point comprehensive ROS undertaken with pertinent positives and negatives as above and otherwise unremarkable.     PHYSICAL EXAM  Temp: 98.3  F (36.8  C) Temp src: Oral BP: (!) 182/122 Pulse: 88   Resp: 18 SpO2: 97 % O2 Device: None (Room air)    Vital Signs with Ranges  Temp:  [97.9  F (36.6  C)-98.3  F (36.8  C)] 98.3  F (36.8  C)  Pulse:  [66-99] 88  Resp:  [18] 18  BP: (141-182)/() 182/122  SpO2:  [97 %-100 %] 97 %  136 lbs 3.91 oz    GENERAL:  Pleasant, cooperative, alert. Appears mildly toxic and dehydrated.  Lethargic but interactive.   HEENT: Normocephalic, atraumatic.  Extra occular mm intact.  Sclera clear. PERRL.  Mucous membranes tacky.  No exudate; uvula midline.     PULMONARY: Clear to auscultation bilaterally .  CARDIAC: Regular rate and rhythm.  No appreciated murmur.    ABDOMEN: Soft, nontender non distended.  TTP in RLQ but no guarding.   MUSCULOSKELETAL:  Moving x 4 spontaneously with CMS intact x4.  Normal bulk and tone.  No LE edema.  Radial pulses 2+ bilaterally.    NEURO: Alert and oriented x3.  CN II-XII grossly intact and symmetric.  No ataxia or tremor.  Nonfocal exam.  SKIN:  Warm, pink, dry.    DATA  Data reviewed today:  I personally reviewed the chest x-ray image(s) showing no acute process.    Most Recent 3 CBC's:  Recent Labs   Lab Test 19  1422 19  0952 16  0750  16  1626 16  1320   WBC  --  14.7* 17.3*  --   --  12.8*   HGB 9.9* 9.2* 9.1*   < >  --  11.8   MCV  --   68* 79  --   --  77*   PLT  --  505* 254  --  313 339    < > = values in this interval not displayed.      Most Recent 3 BMP's:  Recent Labs   Lab Test 12/05/19  1422 12/05/19  0952 09/20/17 05/11/16 02/02/14  1600 09/26/11  1137    133  --   --   --  137 137   POTASSIUM 4.3 4.1 3.7 3.9  3.9  --  3.7 3.9   CHLORIDE 107 104  --   --   --  98 102   CO2  --  19*  --   --   --  22 21   BUN 14 17  --   --   --  7 7   CR  --  0.66 0.46* 0.39*   < > 0.66 0.54   ANIONGAP 10 10  --   --   --  17 15   DB  --  8.7  --   --   --  9.5 9.2   * 304*  --  228*  228*  --  246* 175*    < > = values in this interval not displayed.     Most Recent 2 LFT's:  Recent Labs   Lab Test 12/05/19  0952 05/11/16 01/22/16  0750  02/02/14  1600   AST 10  --  20   < > 16   ALT 12 22 15   < > 22   ALKPHOS 68  --   --   --  127   BILITOTAL 0.2  --   --   --  0.6    < > = values in this interval not displayed.         Recent Results (from the past 24 hour(s))   CT Abdomen Pelvis w/o Contrast    Narrative    CT ABDOMEN AND PELVIS WITHOUT CONTRAST   12/5/2019 10:41 AM     HISTORY: Right-sided flank pain.    TECHNIQUE: No IV contrast material. Radiation dose for this scan was  reduced using automated exposure control, adjustment of the mA and/or  kV according to patient size, or iterative reconstruction technique.    COMPARISON: None.    FINDINGS:    Liver: Previously visualized lesions on MR are not appreciated by  noncontrast CT technique.    Gallbladder: Unremarkable. No pericholecystic fluid.    Spleen: Unremarkable.    Pancreas: No masses given limitations of noncontrast CT technique. No  peripancreatic inflammation.    Adrenal glands: Normal.    Kidneys: No masses appreciated given limitations of noncontrast  technique. No hyperdense renal stones. No hydronephrosis.    Bowel: No dilated loops of bowel to suggest bowel obstruction.  Appendix appears normal.    Lymph nodes: No enlarged abdominal or pelvic lymph  nodes.    Peritoneum: No free fluid. Right-sided Bartholin's gland cyst  measuring 2.2 x 1.5 cm.    Abdominal wall: No hernia identified.    Bones: No suspicious osseous lesions.    Visualized lung bases: Clear.      Impression    IMPRESSION:   1. No acute abnormalities in the abdomen or pelvis to account for the  patient's symptoms. No renal stones or hydronephrosis.  Normal-appearing appendix.  2. Right-sided Bartholin's gland cyst.  3. Previously visualized liver lesions by MR are not appreciated by  noncontrast CT technique.    TREVA CHING MD   Abdomen US, limited (RUQ only)    Narrative    RIGHT UPPER QUADRANT ULTRASOUND December 5, 2019 12:17 PM    HISTORY: Right upper quadrant abdomen pain, vomiting.    COMPARISON: None.    FINDINGS:    Gallbladder: Normal with no cholelithiasis, wall thickening or focal  tenderness.       Bile ducts: CHD is normal diameter. No intrahepatic biliary  dilatation.     Liver: Mildly hyperechoic mass in the right hepatic lobe measuring 4.7  x 5.2 x 3.4 cm. Minimal internal flow by color Doppler. No other liver  masses are appreciated.     Pancreas: Normal     Right kidney: Normal.     Aorta and IVC: Unremarkable.      Impression    IMPRESSION:   1. Mildly hyperechoic mass in the right hepatic lobe measuring 5.2 x  4.7 x 3.4 cm. This lesion is nonspecific and could be further  characterized with liver MRI.  2. Otherwise unremarkable right upper quadrant ultrasound.    TREVA CHING MD   Chest XR,  PA & LAT    Narrative    CHEST TWO VIEWS December 5, 2019 1:46 PM     HISTORY: Right upper quadrant pain.     COMPARISON: Chest CT 9/26/2011.     FINDINGS: Cardiomediastinal silhouette within normal limits. No focal  airspace opacities. No pleural effusion. No pneumothorax identified.  The bones are unremarkable.       Impression    IMPRESSION: No acute cardiopulmonary abnormalities.     TREVA CHING MD

## 2019-12-05 NOTE — ED PROVIDER NOTES
History     Chief Complaint:  Abdominal Pain    HPI   Robert Wallace is a 34 year old female with a history of gestational hypertension, hyperlipidemia, type II diabetes, hypothyroidism, and a hepatic adenoma who presents to the emergency department today for evaluation of abdominal pain. The patient reports that she woke this morning feeling otherwise well and had not yet eaten breakfast when around 0730 she developed a sudden onset, burning, right sided abdominal and back pain with associated nausea and vomiting. She endorses a history of similar symptoms approximately two weeks ago, adding that they resolved without intervention. En route to the ED the patient was provided 4 mg zofran and 15 mg Toradol via EMS without relief. Here she denies any fever, hematemesis, diarrhea, urinary symptoms, shortness of breath, or recent travel.     Allergies:  No Known Drug Allergies     Medications:    Metformin  Labetalol  Synthroid    Past Medical History:    Gestational hypertension  Type II diabetes  Anemia  Hepatic adenoma  Hypothyroidism  Migraine   Hyperlipidemia     Past Surgical History:     section    Family History:    Kidney stones    Social History:  The patient was accompanied to the ED by EMS.  Smoking Status: Never Smoker  Smokeless Tobacco: Never Used  Alcohol Use: Positive  Drug Use: Negative  PCP: Mallory Frederick  Marital Status:        Review of Systems   Constitutional: Negative for fever.   Respiratory: Negative for shortness of breath.    Gastrointestinal: Positive for abdominal pain, nausea and vomiting. Negative for diarrhea.        Hematemesis    Genitourinary: Negative for difficulty urinating, dysuria, frequency, hematuria and urgency.   Musculoskeletal: Positive for back pain.   All other systems reviewed and are negative.      Physical Exam     Patient Vitals for the past 24 hrs:   BP Temp Temp src Pulse Resp SpO2 Weight   19 1600 (!) 196/112 -- -- 88 -- 100 % --    12/05/19 1515 (!) 182/122 -- -- 88 -- 97 % --   12/05/19 1513 -- 98.3  F (36.8  C) Oral -- -- -- --   12/05/19 1500 (!) 164/116 -- -- 89 -- 100 % --   12/05/19 1448 -- -- -- -- -- -- 61.8 kg (136 lb 3.9 oz)   12/05/19 1445 -- -- -- -- -- 100 % --   12/05/19 1330 (!) 153/96 -- -- 75 -- 100 % --   12/05/19 1315 (!) 141/89 -- -- 99 -- -- --   12/05/19 1300 (!) 159/91 -- -- 93 -- 100 % --   12/05/19 1245 (!) 155/99 -- -- 88 -- 100 % --   12/05/19 1230 (!) 145/99 -- -- 86 -- 100 % --   12/05/19 1200 -- -- -- -- -- 100 % --   12/05/19 1145 (!) 141/94 -- -- 72 -- 99 % --   12/05/19 1130 (!) 155/101 -- -- 84 -- 100 % --   12/05/19 1115 (!) 150/95 -- -- 66 -- 100 % --   12/05/19 1100 (!) 152/98 -- -- 73 -- 100 % --   12/05/19 1015 (!) 156/101 -- -- 68 -- 98 % --   12/05/19 1000 (!) 177/93 -- -- 85 -- 100 % --   12/05/19 0947 (!) 148/98 -- -- 88 18 100 % --   12/05/19 0945 -- 97.9  F (36.6  C) Oral -- -- -- --     Physical Exam    Nursing note and vitals reviewed.    Constitutional:  Appears uncomfortable.          HENT:    Mouth/Throat: Oropharynx is without swelling or erythema. Oral mucosa dry.    Eyes: Conjunctivae are normal. No scleral icterus.    Neck: Neck supple.   Cardiovascular: Tachycardic, regular rhythm and intact distal pulses.    Pulmonary/Chest: Effort normal and breath sounds normal.   Abdominal: Soft.  No distension. There is no significant tenderness to deep palpation. .   Musculoskeletal:  No edema, No calf tenderness  Genitourinary: No CVA tenderness.   Neurological:Alert. Coordination normal.   Skin: Skin is warm and dry. Mildly pale appearing.   Psychiatric: Normal mood and affect.     Emergency Department Course     Imaging:  Radiology findings were communicated with the patient who voiced understanding of the findings.    Chest XR,  PA & LAT  No acute cardiopulmonary abnormalities.   TREVA CHING MD  Reading per radiology    Abdomen US, limited (RUQ only)  1. Mildly hyperechoic mass in the right  hepatic lobe measuring 5.2 x 4.7 x 3.4 cm. This lesion is nonspecific and could be further characterized with liver MRI.  2. Otherwise unremarkable right upper quadrant ultrasound.   TREVA CHING MD  Reading per radiology    CT Abdomen Pelvis w/o Contrast  1. No acute abnormalities in the abdomen or pelvis to account for the patient's symptoms. No renal stones or hydronephrosis. Normal-appearing appendix.  2. Right-sided Bartholin's gland cyst.  3. Previously visualized liver lesions by MR are not appreciated by noncontrast CT technique.  TREVA CHING MD  Reading per radiology    Laboratory:  Laboratory findings were communicated with the patient who voiced understanding of the findings.    ISTAT gases lactate bernie POCT: pH: 7.28 (L), PCO2: 39 (L), PO2: 25, Bicarbonate: 18 (L), O2 Sat: 38, Lactic acid: 2.6 (H)  ISTAT Basic Met ICa hematocrit: Total CO2 18 (L), Glucose 240 (H), Creatinine 0.5 (L), HGB 9.9 (L), Hematocrit 29 (L), o/w WNL   ISTAT HCG: <5.0    Blood Gas Venous (Collected 1156): pH 7.26 (L), PCO2 40, PO2 44, Bicarbonate 18 (L), FIO2 R air, Oxyhemoglobin 70, Base Deficit 8.3  Blood Gas Venous: pending.     TSH with Free T4 Reflex: pending.   Ketone Beta-hydroxybutyrate Quantitative: 1.4 (H)  Magnesium: 1.3 (H)  Phosphorus: 3.7  CBC: WBC 14.7 (H), HGB 9.2 (L),  (H)  Hepatic Panel: Albumin 2.9 (L), o/w WNL   Lipase: 187  Glucose by Meter (Collected 0947): 290 (H)    BMP (Collected 0952): Carbon Dioxide 19 (L), Glucose 304 (H) o/w WNL (Creatinine 0.66)  BMP: Pending.    UA with Microscopic: GLC >1000 (A), Ketones 80 (A), Protein Albumin 100 (A), Squamous Epithelial 2 (H), Mucous present (A), Hyaline Casts 3 (H), o/w WNL     Interventions:  1030 NS 1000 ml IV  1031 Dilaudid 0.5 mg IV  1133 Dilaudid 0.5 mg IV  1242 NS 1000 ml IV  1310 Dilaudid 0.5 mg IV  45446 NS 1000 ml IV  1521 Dilaudid 0.5 mg IV    Emergency Department Course:    0947 Glucose by meter obtained as noted above.    0952 IV was inserted  and blood was drawn for laboratory testing, results above.    0956 Nursing notes and vitals reviewed.    1001 I performed an exam of the patient as documented above.     1009 ISTAT HCG obtained as noted above.    1028 The patient provided a urine sample here in the emergency department. This was sent for laboratory testing, findings above. She continues to have discomfort with a benign abdominal exam.     1035 The patient was sent for a CT of the abdomen and pelvis while in the emergency department, results above.     1156 Blood was drawn for laboratory testing as noted above.    1201 The patient was sent for an abdominal ultrasound while in the emergency department, results above.     1342 The patient was sent for a chest xray while in the emergency department, results above.     1508 I spoke with VERNA Elam for Dr. Elliott of the hospitalist service from [facility] regarding patient's presentation, findings, and plan of care.    Findings and plan explained to the Patient who consents to admission. Discussed the patient with ARLENE Elam for Dr. Elliott, who will admit the patient to an [facility] bed for further monitoring, evaluation, and treatment.    Impression & Plan      Medical Decision Making:  Robert Wallace is a 34 year old female with a history of type II diabetes, hypothyroidism, and hypertension who presents with vomiting and abdominal pain as described above. Differential diagnosis included but was not limited to pyelonephritis, nephrolithiasis, cholecystitis, pancreatitis. ED evaluation is as noted above and remarkable for mild hyperglycemia and a mildly low bicarb, as well as ketones and glucose and her urine. For this reason, I did send a VBG and ketones, which put her borderline for diabetic ketoacidosis. She is also noted to have an elevated lactic acid that I suspect is secondary to dehydration from the vomiting. She has no fever or findings of bacterial infection. CT scan revealed no explanation  for her pain and was generally unremarkable. I did obtain an ultrasound, then, which revealed a hepatic mass. This may have been lesions seen on previous MR, but I cannot be certain at this point. With IV hydration, her labs remained minimally changed, still borderline. She is no longer vomiting. She has continued to have difficult control of her abdominal pain. I have spoken with Dr. Elliott who has accepted the patient for admission. We discussed initiating glucose and insulin infusions here, however he prefers another liter of fluids and repeating the labs. He would like her admitted to [facility] for close monitoring, evaluation, and management.     Diagnosis:    ICD-10-CM    1. RUQ abdominal pain R10.11 ISTAT Basic Met ICa HCT POCT     ISTAT Basic Met ICa HCT POCT     Lactic acid whole blood     ISTAT gases lactate bernie POCT     ISTAT gases lactate bernie POCT     Magnesium     Magnesium     Phosphorus     Phosphorus     Basic metabolic panel (BMP)     Blood gas venous and oxyhgb     TSH with free T4 reflex     TSH with free T4 reflex     T4 free     T4 free     Ketone Beta-Hydroxybutyrate Quantitative     Co2 Total     Blood culture     Blood culture     Hemoglobin A1c     Hemoglobin A1c     Osmolality     Osmolality     T4 free     T4 free     Glucose by meter     Glucose by meter     Basic metabolic panel     Glucose by meter     Glucose by meter     CANCELED: Lactic acid whole blood     CANCELED: Magnesium     CANCELED: Phosphorus     CANCELED: TSH with free T4 reflex     CANCELED: Osmolality     CANCELED: Hemoglobin A1c     CANCELED: Potassium   2. Liver mass R16.0    3. Non-intractable vomiting with nausea, unspecified vomiting type R11.2    4. Dehydration E86.0    5. Lactic acidosis E87.2     likely  related to metformin and dehydration     Disposition:   The patient is admitted into the care of Dr. Elliott.    Scribe Disclosure:  Roma PEARCE, am serving as a scribe at 10:07 AM on 12/5/2019 to document services  personally performed by Molly Ayon MD based on my observations and the provider's statements to me.    [facility] EMERGENCY DEPARTMENT       Molly Ayon MD  12/05/19 2141       Molly Ayon MD  12/05/19 2145

## 2019-12-05 NOTE — PHARMACY-ADMISSION MEDICATION HISTORY
Admission medication history interview status for this patient is complete. See [facility] admission navigator for allergy information, prior to admission medications and immunization status.     Medication history interview source(s):Patient  Medication history resources (including written lists, pill bottles, clinic record):None  Primary pharmacy: [facility]    Changes made to PTA medication list:  Added: lisinopril, metformin, glipizide, birth control  Deleted: insulin detemir, insulin lispro, labetalol, oxycodone, PNV, senna-docusate  Changed: levothyroxine (175 --> 150 mcg)    Actions taken by pharmacist (provider contacted, etc):None   Additional medication history information:None  Medication reconciliation/reorder completed by provider prior to medication history?  No     Prior to Admission medications    Medication Sig Last Dose Taking? Auth Provider   glipiZIDE (GLUCOTROL XL) 10 MG 24 hr tablet Take 10 mg by mouth daily 12/4/2019 at am Yes Unknown, Entered By History   ibuprofen (ADVIL,MOTRIN) 400 MG tablet Take 1-2 tablets (400-800 mg) by mouth every 6 hours as needed for other (cramping) Past Week at Unknown time Yes Johanne Patricia MD   levothyroxine (SYNTHROID/LEVOTHROID) 150 MCG tablet Take 150 mcg by mouth daily 12/4/2019 at am Yes Unknown, Entered By History   lisinopril (PRINIVIL/ZESTRIL) 5 MG tablet Take 5 mg by mouth daily 12/4/2019 at am Yes Unknown, Entered By History   metFORMIN (GLUCOPHAGE) 1000 MG tablet Take 1,000 mg by mouth 2 times daily (with meals) 12/4/2019 at x 2 Yes Unknown, Entered By History   FRANKLIN 3-0.02 MG tablet Take 1 tablet by mouth daily 12/4/2019 at am Yes Unknown, Entered By History

## 2019-12-06 LAB
ANION GAP SERPL CALCULATED.3IONS-SCNC: 9 MMOL/L (ref 3–14)
BASOPHILS # BLD AUTO: 0 10E9/L (ref 0–0.2)
BASOPHILS NFR BLD AUTO: 0.3 %
BUN SERPL-MCNC: 9 MG/DL (ref 7–30)
CALCIUM SERPL-MCNC: 7.7 MG/DL (ref 8.5–10.1)
CHLORIDE SERPL-SCNC: 107 MMOL/L (ref 94–109)
CO2 SERPL-SCNC: 20 MMOL/L (ref 20–32)
CREAT SERPL-MCNC: 0.52 MG/DL (ref 0.52–1.04)
CRP SERPL-MCNC: 14.5 MG/L (ref 0–8)
DIFFERENTIAL METHOD BLD: ABNORMAL
EOSINOPHIL # BLD AUTO: 0.1 10E9/L (ref 0–0.7)
EOSINOPHIL NFR BLD AUTO: 0.8 %
ERYTHROCYTE [DISTWIDTH] IN BLOOD BY AUTOMATED COUNT: 16.9 % (ref 10–15)
GFR SERPL CREATININE-BSD FRML MDRD: >90 ML/MIN/{1.73_M2}
GLUCOSE BLDC GLUCOMTR-MCNC: 142 MG/DL (ref 70–99)
GLUCOSE BLDC GLUCOMTR-MCNC: 157 MG/DL (ref 70–99)
GLUCOSE BLDC GLUCOMTR-MCNC: 178 MG/DL (ref 70–99)
GLUCOSE BLDC GLUCOMTR-MCNC: 188 MG/DL (ref 70–99)
GLUCOSE BLDC GLUCOMTR-MCNC: 87 MG/DL (ref 70–99)
GLUCOSE SERPL-MCNC: 178 MG/DL (ref 70–99)
HCG SERPL QL: NEGATIVE
HCT VFR BLD AUTO: 29.4 % (ref 35–47)
HGB BLD-MCNC: 8.3 G/DL (ref 11.7–15.7)
IMM GRANULOCYTES # BLD: 0 10E9/L (ref 0–0.4)
IMM GRANULOCYTES NFR BLD: 0.3 %
LYMPHOCYTES # BLD AUTO: 2.4 10E9/L (ref 0.8–5.3)
LYMPHOCYTES NFR BLD AUTO: 20.6 %
MAGNESIUM SERPL-MCNC: 2.3 MG/DL (ref 1.6–2.3)
MCH RBC QN AUTO: 19.2 PG (ref 26.5–33)
MCHC RBC AUTO-ENTMCNC: 28.2 G/DL (ref 31.5–36.5)
MCV RBC AUTO: 68 FL (ref 78–100)
MONOCYTES # BLD AUTO: 0.7 10E9/L (ref 0–1.3)
MONOCYTES NFR BLD AUTO: 5.6 %
NEUTROPHILS # BLD AUTO: 8.4 10E9/L (ref 1.6–8.3)
NEUTROPHILS NFR BLD AUTO: 72.4 %
NRBC # BLD AUTO: 0 10*3/UL
NRBC BLD AUTO-RTO: 0 /100
PLATELET # BLD AUTO: 436 10E9/L (ref 150–450)
POTASSIUM SERPL-SCNC: 3.7 MMOL/L (ref 3.4–5.3)
RBC # BLD AUTO: 4.32 10E12/L (ref 3.8–5.2)
SODIUM SERPL-SCNC: 136 MMOL/L (ref 133–144)
WBC # BLD AUTO: 11.7 10E9/L (ref 4–11)

## 2019-12-06 PROCEDURE — 86140 C-REACTIVE PROTEIN: CPT | Performed by: PHYSICIAN ASSISTANT

## 2019-12-06 PROCEDURE — 25000128 H RX IP 250 OP 636: Performed by: INTERNAL MEDICINE

## 2019-12-06 PROCEDURE — 99232 SBSQ HOSP IP/OBS MODERATE 35: CPT | Performed by: INTERNAL MEDICINE

## 2019-12-06 PROCEDURE — 12000000 ZZH R&B MED SURG/OB

## 2019-12-06 PROCEDURE — 80048 BASIC METABOLIC PNL TOTAL CA: CPT | Performed by: PHYSICIAN ASSISTANT

## 2019-12-06 PROCEDURE — 84703 CHORIONIC GONADOTROPIN ASSAY: CPT | Performed by: INTERNAL MEDICINE

## 2019-12-06 PROCEDURE — 36415 COLL VENOUS BLD VENIPUNCTURE: CPT | Performed by: INTERNAL MEDICINE

## 2019-12-06 PROCEDURE — 25000128 H RX IP 250 OP 636: Performed by: PHYSICIAN ASSISTANT

## 2019-12-06 PROCEDURE — 99207 ZZC CDG-MDM COMPONENT: MEETS LOW - DOWN CODED: CPT | Performed by: INTERNAL MEDICINE

## 2019-12-06 PROCEDURE — 83735 ASSAY OF MAGNESIUM: CPT | Performed by: INTERNAL MEDICINE

## 2019-12-06 PROCEDURE — 25000132 ZZH RX MED GY IP 250 OP 250 PS 637: Performed by: INTERNAL MEDICINE

## 2019-12-06 PROCEDURE — 36415 COLL VENOUS BLD VENIPUNCTURE: CPT | Performed by: PHYSICIAN ASSISTANT

## 2019-12-06 PROCEDURE — 85025 COMPLETE CBC W/AUTO DIFF WBC: CPT | Performed by: PHYSICIAN ASSISTANT

## 2019-12-06 PROCEDURE — 25800030 ZZH RX IP 258 OP 636: Performed by: INTERNAL MEDICINE

## 2019-12-06 PROCEDURE — 00000146 ZZHCL STATISTIC GLUCOSE BY METER IP

## 2019-12-06 PROCEDURE — 25000132 ZZH RX MED GY IP 250 OP 250 PS 637: Performed by: PHYSICIAN ASSISTANT

## 2019-12-06 RX ORDER — KETOROLAC TROMETHAMINE 30 MG/ML
30 INJECTION, SOLUTION INTRAMUSCULAR; INTRAVENOUS EVERY 6 HOURS PRN
Status: DISCONTINUED | OUTPATIENT
Start: 2019-12-06 | End: 2019-12-08 | Stop reason: HOSPADM

## 2019-12-06 RX ORDER — LISINOPRIL 5 MG/1
5 TABLET ORAL DAILY
Status: DISCONTINUED | OUTPATIENT
Start: 2019-12-06 | End: 2019-12-06

## 2019-12-06 RX ORDER — LISINOPRIL 5 MG/1
5 TABLET ORAL DAILY
Status: DISCONTINUED | OUTPATIENT
Start: 2019-12-06 | End: 2019-12-08 | Stop reason: HOSPADM

## 2019-12-06 RX ORDER — HYDROMORPHONE HYDROCHLORIDE 1 MG/ML
.3-.5 INJECTION, SOLUTION INTRAMUSCULAR; INTRAVENOUS; SUBCUTANEOUS EVERY 4 HOURS PRN
Status: DISCONTINUED | OUTPATIENT
Start: 2019-12-06 | End: 2019-12-08 | Stop reason: HOSPADM

## 2019-12-06 RX ORDER — HYDRALAZINE HYDROCHLORIDE 20 MG/ML
10 INJECTION INTRAMUSCULAR; INTRAVENOUS EVERY 4 HOURS PRN
Status: DISCONTINUED | OUTPATIENT
Start: 2019-12-06 | End: 2019-12-08 | Stop reason: HOSPADM

## 2019-12-06 RX ADMIN — HYDROMORPHONE HYDROCHLORIDE 0.5 MG: 1 INJECTION, SOLUTION INTRAMUSCULAR; INTRAVENOUS; SUBCUTANEOUS at 10:40

## 2019-12-06 RX ADMIN — ENOXAPARIN SODIUM 40 MG: 40 INJECTION SUBCUTANEOUS at 18:34

## 2019-12-06 RX ADMIN — METFORMIN HYDROCHLORIDE 1000 MG: 500 TABLET, FILM COATED ORAL at 18:34

## 2019-12-06 RX ADMIN — SENNOSIDES AND DOCUSATE SODIUM 1 TABLET: 8.6; 5 TABLET ORAL at 18:40

## 2019-12-06 RX ADMIN — LEVOTHYROXINE SODIUM 150 MCG: 75 TABLET ORAL at 09:01

## 2019-12-06 RX ADMIN — KETOROLAC TROMETHAMINE 30 MG: 30 INJECTION, SOLUTION INTRAMUSCULAR at 15:59

## 2019-12-06 RX ADMIN — ONDANSETRON HYDROCHLORIDE 4 MG: 2 INJECTION, SOLUTION INTRAMUSCULAR; INTRAVENOUS at 06:23

## 2019-12-06 RX ADMIN — LISINOPRIL 5 MG: 5 TABLET ORAL at 12:38

## 2019-12-06 RX ADMIN — HYDROMORPHONE HYDROCHLORIDE 0.5 MG: 1 INJECTION, SOLUTION INTRAMUSCULAR; INTRAVENOUS; SUBCUTANEOUS at 22:09

## 2019-12-06 RX ADMIN — HYDROMORPHONE HYDROCHLORIDE 0.5 MG: 1 INJECTION, SOLUTION INTRAMUSCULAR; INTRAVENOUS; SUBCUTANEOUS at 15:58

## 2019-12-06 RX ADMIN — SODIUM CHLORIDE: 9 INJECTION, SOLUTION INTRAVENOUS at 16:03

## 2019-12-06 RX ADMIN — INSULIN ASPART 1 UNITS: 100 INJECTION, SOLUTION INTRAVENOUS; SUBCUTANEOUS at 18:34

## 2019-12-06 RX ADMIN — SODIUM CHLORIDE: 9 INJECTION, SOLUTION INTRAVENOUS at 06:18

## 2019-12-06 RX ADMIN — INSULIN ASPART 2 UNITS: 100 INJECTION, SOLUTION INTRAVENOUS; SUBCUTANEOUS at 09:14

## 2019-12-06 RX ADMIN — METFORMIN HYDROCHLORIDE 1000 MG: 500 TABLET, FILM COATED ORAL at 12:37

## 2019-12-06 RX ADMIN — HYDROMORPHONE HYDROCHLORIDE 0.5 MG: 1 INJECTION, SOLUTION INTRAMUSCULAR; INTRAVENOUS; SUBCUTANEOUS at 00:29

## 2019-12-06 RX ADMIN — HYDROMORPHONE HYDROCHLORIDE 0.5 MG: 1 INJECTION, SOLUTION INTRAMUSCULAR; INTRAVENOUS; SUBCUTANEOUS at 04:07

## 2019-12-06 RX ADMIN — HYDROMORPHONE HYDROCHLORIDE 0.5 MG: 1 INJECTION, SOLUTION INTRAMUSCULAR; INTRAVENOUS; SUBCUTANEOUS at 06:18

## 2019-12-06 RX ADMIN — HYDROMORPHONE HYDROCHLORIDE 0.5 MG: 1 INJECTION, SOLUTION INTRAMUSCULAR; INTRAVENOUS; SUBCUTANEOUS at 01:48

## 2019-12-06 RX ADMIN — KETOROLAC TROMETHAMINE 30 MG: 30 INJECTION, SOLUTION INTRAMUSCULAR at 09:11

## 2019-12-06 ASSESSMENT — ACTIVITIES OF DAILY LIVING (ADL)
ADLS_ACUITY_SCORE: 9

## 2019-12-06 NOTE — PROGRESS NOTES
MD verbal orders to discontinue QHr Neuro checks, QHr V/S and strict I&O status. MD agrees c/ subcutaneous insulin orders.  Nurse note: Pt is neurologically intact at this time. MD is aware of Pts last B/P result. Will continue to monitor Pt.

## 2019-12-06 NOTE — CONSULTS
"BRIEF NUTRITION ASSESSMENT    REASON FOR ASSESSMENT:  Positive nutrition risk screen - New/uncontrolled diabetes  CURRENT DIET AND NOURISHMENT ORDER:  Information obtained from patient, unable to obtain detailed diet history  Patient is on a regular diet at home  Reports seeing a dietitian/diabetes educator in the past for education and did not find it to be helpful or recall specifics of what was discussed.   Not checking blood glucose daily  Food allergies/intolerances: NKFA    Diet: moderate consistent carb  Current Intake/Tolerance: Per flow sheet review, 25-75% intake for majority of documented meals.    ANTHROPOMETRICS  Height: 5' 0\"  Weight: 62 kg   Body mass index is 26.99 kg/m .  Weight Status:  Overweight BMI 25-29.9  Ideal body weight: 45 kg +/- 10%, 139% of IBW  Wt Readings from Last 10 Encounters:   12/05/19 62.7 kg (138 lb 3.2 oz)   01/19/16 81.6 kg (180 lb)       ASSESSED NUTRITION NEEDS (PER APPROVED PRACTICE GUIDELINES, Dosing weight: 62.7 kg)  Estimated Energy Needs: 8371-1150 kcals (25-30 Kcal/Kg)  Justification: maintenance  Estimated Protein Needs: 63-75 grams protein (1-1.2 g pro/Kg)  Justification: maintenance  Estimated Fluid Needs: >1 mL/Kcal  Justification: maintenance    LABS/MEDS/PHYSICAL FINDINGS:  Meds reviewed  No obvious signs of fat or muscle wasting  Labs reviewed    Lab Results   Component Value Date    A1C 9.6 12/05/2019    A1C 9.9 09/20/2017    A1C 9.8 05/11/2016    A1C 8.3 06/16/2015    A1C 8.7 11/06/2014     Recent Labs   Lab 12/06/19  0608 12/05/19  1915 12/05/19  1640 12/05/19  1422 12/05/19  0952   * 158* 190* 240* 304*       Malnutrition:  Patient does not meet two of the following criteria necessary for diagnosing malnutrition: significant weight loss, reduced intake, subcutaneous fat loss, muscle loss or fluid retention    INTERVENTION:  Nutrition Diagnosis:  Altered lab value related to uncontrolled diabetes as evidenced by Hgb A1c of " 9.6    Implementation:  Nutrition Education:    Content and application: only left handouts and contact information. Patient preferred to review materials and suggested she contact RD if questions arise while admitted - left the Understanding Diabetes booklet    Anticipate poor to fair compliance     Left RD contact information should any future questions arise    Recommend outpatient nutrition/CDE follow up questions/support    Follow Up/Monitoring:   Progress towards goals will be monitored and evaluated per protocol and Practice Guidelines        Leda Joe RDN, LD, CNSC  Pager - 3rd floor/ICU: [phone]  Pager - All other floors: [phone]  Pager - Weekend/holiday: [phone]  Office: [phone]

## 2019-12-06 NOTE — PLAN OF CARE
Hx: Pt was admitted today (12/05) for abdominal pain in the RLQ that radiates to the back as well as N/V. Pt is being treated for DKA c/ an admitting BGL of 304 mg/dL. Currently Insulin gtt is no-longer necessary as Pts BGL is currently controlled c/ subcutaneous insulin. PMH includes gestational HTN, T2DM, hypothyroidism, and hepatic adenoma.  Orientation: A&OX4, Ambulation: Independent.  V/S: B/P elevated, in 160's / 100's. Although B/P is trending down from admission from the ED. Pt is on RA. Pain: 9/10 RLQ pain that is relieved c/ 0.3 mL IV dilaudid. BGL: 158 & 125 mg/dL at AC & HS. Sliding scale insulin administered per protocol.  Tele/CV: SR.  LS: Clear, equal bilaterally.  : Voids spontaneously without hesitancy.  GI: Active & audible X4. RLQ tenderness. Pt complained of intermittent nausea. This was relieved c/ SL zofran.  IV: IVF infusing at 100 mL/Hr. Magnesium replaced during shift. Results pending.  Plan: Continue to monitor. Return to a euglycemic state prior to discharge. Blood cultures pending.  Eduction: Educated Pt about first time dose of Dilaudid as well as magnesium. Writer promoted and answered Pt's questions. Pt verbalized an understanding of the teaching.

## 2019-12-06 NOTE — PLAN OF CARE
VSS. BP 150s-160s/80s-90s. Pt c/o pain to RLQ. Prn dilaudid given x3 with relief. A&Ox4. LS clear. Tele SR in the 90s. Denies CP or SOB. BS active but pt reported no passing of gas.  this shift. Skin intact. Mg recheck 2.3. K 3.7. Denies dizziness. C/o nausea this morning. Prn zofran given x1. IVF infusing. Plan to discharge in 3-4 days. Continue to monitor per POC.

## 2019-12-06 NOTE — PROGRESS NOTES
[facility]    Medicine Progress Note - Hospitalist Service       Date of Admission:  12/5/2019  Assessment & Plan   Robert Wallace is a 34 year old female with type 2 diabetes mellitus, hypertension, chronic anemia, hypothyroidism, and history of hepatic adenoma who presented to [facility] on 12/5/2019 with sudden onset nausea/vomiting and abdominal pain and is found to have mild DKA.      Diabetic ketoacidosis  - resolved.  - On sliding scale insulin.     Abdominal pain, N/V   Continue aggressive hydration, antiemetics, antispasmotics.    Etiology unclear, CT A/P showed no acute process and abdominal exam is benign.  Monitor.     Hypertension  Resume PTA meds.     Hypothyroidism  PTA synthroid continued.  TFTs added on.      Hyperechoic liver mass  Mildly hyperechoic liver mass noted on the U/S.  May be related to patient's known hepatic adenoma.  MRI Liver recommended by Radiology if further characterization is needed.  Will hold off for now.          Diet: Advance Diet as Tolerated: Clear Liquid Diet; 6895-3447 Calories: Moderate Consistent CHO (4-6 CHO units/meal)    DVT Prophylaxis: Enoxaparin (Lovenox) SQ  Velasquez Catheter: not present  Code Status: Full Code      Disposition Plan   Expected discharge: 2 - 3 days, recommended to prior living arrangement once adequate pain management/ tolerating PO medications.  Entered: Macarena Elliott MD 12/06/2019, 7:48 AM       The patient's care was discussed with the Patient.    Macarena Elliott MD  Hospitalist Service  [facility]    ______________________________________________________________________    Interval History     No fevers/chills. + nausea and no emesis. + RLQ abdominal pain.    Data reviewed today: I reviewed all medications, new labs and imaging results over the last 24 hours. I personally reviewed no images or EKG's today.    Physical Exam   Vital Signs: Temp: 98.7  F (37.1  C) Temp src: Oral BP: (!) 158/102 Pulse: 72   Resp: 20 SpO2:  100 % O2 Device: None (Room air)    Weight: 138 lbs 3.2 oz    Gen - AAO x 3 in NAD.  Lungs - CTA B.  Heart - RR,S1+S2 nml, no m/g/r.  Abd - soft, NT, ND, + BS.  Ext - no edema.    Data   Recent Labs   Lab 12/06/19  0608 12/05/19  1915 12/05/19  1723 12/05/19  1640 12/05/19  1422 12/05/19  0952   WBC  --   --   --   --   --  14.7*   HGB  --   --   --   --  9.9* 9.2*   MCV  --   --   --   --   --  68*   PLT  --   --   --   --   --  505*    137  --  136 135 133   POTASSIUM 3.7 3.7  --  4.1 4.3 4.1   CHLORIDE 107 108  --  108 107 104   CO2 20 20 19* 19*  --  19*   BUN 9 13  --  14 14 17   CR 0.52 0.58  --  0.53  --  0.66   ANIONGAP 9 9  --  9 10 10   DB 7.7* 7.9*  --  8.0*  --  8.7   * 158*  --  190* 240* 304*   ALBUMIN  --   --   --   --   --  2.9*   PROTTOTAL  --   --   --   --   --  7.6   BILITOTAL  --   --   --   --   --  0.2   ALKPHOS  --   --   --   --   --  68   ALT  --   --   --   --   --  12   AST  --   --   --   --   --  10   LIPASE  --   --   --   --   --  187     Recent Results (from the past 24 hour(s))   CT Abdomen Pelvis w/o Contrast    Narrative    CT ABDOMEN AND PELVIS WITHOUT CONTRAST   12/5/2019 10:41 AM     HISTORY: Right-sided flank pain.    TECHNIQUE: No IV contrast material. Radiation dose for this scan was  reduced using automated exposure control, adjustment of the mA and/or  kV according to patient size, or iterative reconstruction technique.    COMPARISON: None.    FINDINGS:    Liver: Previously visualized lesions on MR are not appreciated by  noncontrast CT technique.    Gallbladder: Unremarkable. No pericholecystic fluid.    Spleen: Unremarkable.    Pancreas: No masses given limitations of noncontrast CT technique. No  peripancreatic inflammation.    Adrenal glands: Normal.    Kidneys: No masses appreciated given limitations of noncontrast  technique. No hyperdense renal stones. No hydronephrosis.    Bowel: No dilated loops of bowel to suggest bowel obstruction.  Appendix  appears normal.    Lymph nodes: No enlarged abdominal or pelvic lymph nodes.    Peritoneum: No free fluid. Right-sided Bartholin's gland cyst  measuring 2.2 x 1.5 cm.    Abdominal wall: No hernia identified.    Bones: No suspicious osseous lesions.    Visualized lung bases: Clear.      Impression    IMPRESSION:   1. No acute abnormalities in the abdomen or pelvis to account for the  patient's symptoms. No renal stones or hydronephrosis.  Normal-appearing appendix.  2. Right-sided Bartholin's gland cyst.  3. Previously visualized liver lesions by MR are not appreciated by  noncontrast CT technique.    TREVA CHING MD   Abdomen US, limited (RUQ only)    Narrative    RIGHT UPPER QUADRANT ULTRASOUND December 5, 2019 12:17 PM    HISTORY: Right upper quadrant abdomen pain, vomiting.    COMPARISON: None.    FINDINGS:    Gallbladder: Normal with no cholelithiasis, wall thickening or focal  tenderness.       Bile ducts: CHD is normal diameter. No intrahepatic biliary  dilatation.     Liver: Mildly hyperechoic mass in the right hepatic lobe measuring 4.7  x 5.2 x 3.4 cm. Minimal internal flow by color Doppler. No other liver  masses are appreciated.     Pancreas: Normal     Right kidney: Normal.     Aorta and IVC: Unremarkable.      Impression    IMPRESSION:   1. Mildly hyperechoic mass in the right hepatic lobe measuring 5.2 x  4.7 x 3.4 cm. This lesion is nonspecific and could be further  characterized with liver MRI.  2. Otherwise unremarkable right upper quadrant ultrasound.    TREVA CHING MD   Chest XR,  PA & LAT    Narrative    CHEST TWO VIEWS December 5, 2019 1:46 PM     HISTORY: Right upper quadrant pain.     COMPARISON: Chest CT 9/26/2011.     FINDINGS: Cardiomediastinal silhouette within normal limits. No focal  airspace opacities. No pleural effusion. No pneumothorax identified.  The bones are unremarkable.       Impression    IMPRESSION: No acute cardiopulmonary abnormalities.     TREVA CHING MD      Medications     - MEDICATION INSTRUCTIONS -       sodium chloride 100 mL/hr at 12/06/19 0618       enoxaparin ANTICOAGULANT  40 mg Subcutaneous Q24H     insulin aspart  1-10 Units Subcutaneous TID AC     insulin aspart  1-7 Units Subcutaneous At Bedtime     levothyroxine  150 mcg Oral Daily     sodium chloride (PF)  3 mL Intracatheter Q8H

## 2019-12-07 ENCOUNTER — APPOINTMENT (OUTPATIENT)
Dept: ULTRASOUND IMAGING | Facility: CLINIC | Age: 34
DRG: 639 | End: 2019-12-07
Attending: INTERNAL MEDICINE
Payer: COMMERCIAL

## 2019-12-07 LAB
ALBUMIN SERPL-MCNC: 2.1 G/DL (ref 3.4–5)
ALP SERPL-CCNC: 62 U/L (ref 40–150)
ALT SERPL W P-5'-P-CCNC: 97 U/L (ref 0–50)
ANION GAP SERPL CALCULATED.3IONS-SCNC: 8 MMOL/L (ref 3–14)
AST SERPL W P-5'-P-CCNC: 92 U/L (ref 0–45)
BASOPHILS # BLD AUTO: 0.1 10E9/L (ref 0–0.2)
BASOPHILS NFR BLD AUTO: 0.5 %
BILIRUB SERPL-MCNC: 0.3 MG/DL (ref 0.2–1.3)
BUN SERPL-MCNC: 8 MG/DL (ref 7–30)
CALCIUM SERPL-MCNC: 7.8 MG/DL (ref 8.5–10.1)
CHLORIDE SERPL-SCNC: 110 MMOL/L (ref 94–109)
CO2 SERPL-SCNC: 20 MMOL/L (ref 20–32)
CREAT SERPL-MCNC: 0.54 MG/DL (ref 0.52–1.04)
DIFFERENTIAL METHOD BLD: ABNORMAL
EOSINOPHIL # BLD AUTO: 0.2 10E9/L (ref 0–0.7)
EOSINOPHIL NFR BLD AUTO: 1.8 %
ERYTHROCYTE [DISTWIDTH] IN BLOOD BY AUTOMATED COUNT: 17 % (ref 10–15)
FERRITIN SERPL-MCNC: 11 NG/ML (ref 12–150)
GFR SERPL CREATININE-BSD FRML MDRD: >90 ML/MIN/{1.73_M2}
GLUCOSE BLDC GLUCOMTR-MCNC: 144 MG/DL (ref 70–99)
GLUCOSE BLDC GLUCOMTR-MCNC: 153 MG/DL (ref 70–99)
GLUCOSE BLDC GLUCOMTR-MCNC: 167 MG/DL (ref 70–99)
GLUCOSE BLDC GLUCOMTR-MCNC: 191 MG/DL (ref 70–99)
GLUCOSE SERPL-MCNC: 181 MG/DL (ref 70–99)
HCT VFR BLD AUTO: 27.7 % (ref 35–47)
HGB BLD-MCNC: 7.9 G/DL (ref 11.7–15.7)
IMM GRANULOCYTES # BLD: 0 10E9/L (ref 0–0.4)
IMM GRANULOCYTES NFR BLD: 0.3 %
IRON SATN MFR SERPL: 3 % (ref 15–46)
IRON SERPL-MCNC: 14 UG/DL (ref 35–180)
LYMPHOCYTES # BLD AUTO: 3 10E9/L (ref 0.8–5.3)
LYMPHOCYTES NFR BLD AUTO: 23.7 %
MCH RBC QN AUTO: 19.7 PG (ref 26.5–33)
MCHC RBC AUTO-ENTMCNC: 28.5 G/DL (ref 31.5–36.5)
MCV RBC AUTO: 69 FL (ref 78–100)
MONOCYTES # BLD AUTO: 0.8 10E9/L (ref 0–1.3)
MONOCYTES NFR BLD AUTO: 6.7 %
NEUTROPHILS # BLD AUTO: 8.4 10E9/L (ref 1.6–8.3)
NEUTROPHILS NFR BLD AUTO: 67 %
NRBC # BLD AUTO: 0 10*3/UL
NRBC BLD AUTO-RTO: 0 /100
PLATELET # BLD AUTO: 401 10E9/L (ref 150–450)
POTASSIUM SERPL-SCNC: 3.6 MMOL/L (ref 3.4–5.3)
PROT SERPL-MCNC: 6.3 G/DL (ref 6.8–8.8)
RBC # BLD AUTO: 4.02 10E12/L (ref 3.8–5.2)
SODIUM SERPL-SCNC: 138 MMOL/L (ref 133–144)
TIBC SERPL-MCNC: 441 UG/DL (ref 240–430)
WBC # BLD AUTO: 12.5 10E9/L (ref 4–11)

## 2019-12-07 PROCEDURE — 85025 COMPLETE CBC W/AUTO DIFF WBC: CPT | Performed by: INTERNAL MEDICINE

## 2019-12-07 PROCEDURE — 83550 IRON BINDING TEST: CPT | Performed by: INTERNAL MEDICINE

## 2019-12-07 PROCEDURE — 82728 ASSAY OF FERRITIN: CPT | Performed by: INTERNAL MEDICINE

## 2019-12-07 PROCEDURE — 12000000 ZZH R&B MED SURG/OB

## 2019-12-07 PROCEDURE — 25800030 ZZH RX IP 258 OP 636: Performed by: INTERNAL MEDICINE

## 2019-12-07 PROCEDURE — 80053 COMPREHEN METABOLIC PANEL: CPT | Performed by: INTERNAL MEDICINE

## 2019-12-07 PROCEDURE — 25000132 ZZH RX MED GY IP 250 OP 250 PS 637: Performed by: INTERNAL MEDICINE

## 2019-12-07 PROCEDURE — 36415 COLL VENOUS BLD VENIPUNCTURE: CPT | Performed by: INTERNAL MEDICINE

## 2019-12-07 PROCEDURE — 25000128 H RX IP 250 OP 636: Performed by: INTERNAL MEDICINE

## 2019-12-07 PROCEDURE — 83540 ASSAY OF IRON: CPT | Performed by: INTERNAL MEDICINE

## 2019-12-07 PROCEDURE — 93976 VASCULAR STUDY: CPT

## 2019-12-07 PROCEDURE — 99232 SBSQ HOSP IP/OBS MODERATE 35: CPT | Performed by: INTERNAL MEDICINE

## 2019-12-07 PROCEDURE — 25000132 ZZH RX MED GY IP 250 OP 250 PS 637: Performed by: PHYSICIAN ASSISTANT

## 2019-12-07 PROCEDURE — 00000146 ZZHCL STATISTIC GLUCOSE BY METER IP

## 2019-12-07 RX ORDER — LEVOTHYROXINE SODIUM 125 UG/1
125 TABLET ORAL DAILY
Status: DISCONTINUED | OUTPATIENT
Start: 2019-12-07 | End: 2019-12-08 | Stop reason: HOSPADM

## 2019-12-07 RX ADMIN — INSULIN ASPART 1 UNITS: 100 INJECTION, SOLUTION INTRAVENOUS; SUBCUTANEOUS at 09:57

## 2019-12-07 RX ADMIN — HYDROMORPHONE HYDROCHLORIDE 0.5 MG: 1 INJECTION, SOLUTION INTRAMUSCULAR; INTRAVENOUS; SUBCUTANEOUS at 16:56

## 2019-12-07 RX ADMIN — ACETAMINOPHEN 650 MG: 325 TABLET, FILM COATED ORAL at 02:01

## 2019-12-07 RX ADMIN — LISINOPRIL 5 MG: 5 TABLET ORAL at 09:40

## 2019-12-07 RX ADMIN — IBUPROFEN 600 MG: 600 TABLET ORAL at 12:54

## 2019-12-07 RX ADMIN — ACETAMINOPHEN 650 MG: 325 TABLET, FILM COATED ORAL at 16:56

## 2019-12-07 RX ADMIN — SODIUM CHLORIDE: 9 INJECTION, SOLUTION INTRAVENOUS at 01:29

## 2019-12-07 RX ADMIN — METFORMIN HYDROCHLORIDE 1000 MG: 500 TABLET, FILM COATED ORAL at 18:26

## 2019-12-07 RX ADMIN — INSULIN ASPART 2 UNITS: 100 INJECTION, SOLUTION INTRAVENOUS; SUBCUTANEOUS at 18:23

## 2019-12-07 RX ADMIN — LEVOTHYROXINE SODIUM 125 MCG: 125 TABLET ORAL at 09:40

## 2019-12-07 RX ADMIN — KETOROLAC TROMETHAMINE 30 MG: 30 INJECTION, SOLUTION INTRAMUSCULAR at 06:35

## 2019-12-07 RX ADMIN — HYDROMORPHONE HYDROCHLORIDE 0.5 MG: 1 INJECTION, SOLUTION INTRAMUSCULAR; INTRAVENOUS; SUBCUTANEOUS at 02:52

## 2019-12-07 RX ADMIN — METFORMIN HYDROCHLORIDE 1000 MG: 500 TABLET, FILM COATED ORAL at 09:40

## 2019-12-07 RX ADMIN — HYDROMORPHONE HYDROCHLORIDE 0.5 MG: 1 INJECTION, SOLUTION INTRAMUSCULAR; INTRAVENOUS; SUBCUTANEOUS at 21:08

## 2019-12-07 RX ADMIN — ACETAMINOPHEN 650 MG: 325 TABLET, FILM COATED ORAL at 09:54

## 2019-12-07 RX ADMIN — ACETAMINOPHEN 650 MG: 325 TABLET, FILM COATED ORAL at 21:08

## 2019-12-07 ASSESSMENT — MIFFLIN-ST. JEOR: SCORE: 1239.73

## 2019-12-07 ASSESSMENT — ACTIVITIES OF DAILY LIVING (ADL)
ADLS_ACUITY_SCORE: 9

## 2019-12-07 NOTE — CONSULTS
CM saw patient for elevated A1c of 9.6. She has a history of DM & DKA. Introduced self and role. Verified address (house) and status ( to [facility]). She does not check her BG levels often, only 3-4 times/week. She is unable to recall range her PCP wants her blood glucose levels at. She follows with Silvina Chen CNP at [facility] in [place]. She has an appointment on Thursday, 12/19/19 at 4:20pm. She does not follow with an Endocrinologist. FLORENTINO recommended she ask Tiffany Chen for a referral to an endocrinologist at her appointment in December.     CM provided patient with a [employer] CHO Counting book. Explained its use and where to find information on CHO count. Patient accepted book.     Her  and her mother are her support system. Her  will transport her at discharge.     CM will continue to follow patient until discharge for any additional needs.     Jennifer Florez RN, BSN, CPHN, CM  Inpatient Care Coordination  [facility]  [phone]

## 2019-12-07 NOTE — PLAN OF CARE
Pt alert and oriented X 4. Apical pulse regular. Tele-NSR. Lung sounds diminished throughout all lobes. IV fluids stopped. Pt reports 8/10 headache, Tylenol given and heating pad applied- not effective. MD paged for headache management. Pt encouraged to walk in hallways. Abdominal pain 1/10 this AM. US complete. , sliding scale, metformin.   Plan: Pain management, blood glucose management, discharge tomorrow 12/8/19.

## 2019-12-07 NOTE — PROGRESS NOTES
[facility]    Medicine Progress Note - Hospitalist Service       Date of Admission:  12/5/2019  Assessment & Plan   Robert Wallace is a 34 year old female with type 2 diabetes mellitus, hypertension, chronic anemia, hypothyroidism, and history of hepatic adenoma who presented to [facility] on 12/5/2019 with sudden onset nausea/vomiting and abdominal pain and is found to have mild DKA.      Diabetic ketoacidosis  - resolved.  - On sliding scale insulin.     Abdominal pain, N/V   Continue aggressive hydration, antiemetics, antispasmotics.    Etiology unclear, CT A/P showed no acute process and abdominal exam is benign.  Monitor.  Will get pelvic U/S to eval.     Hypertension  Resume PTA meds.     Hyperthyroidism  PTA synthroid continued at lower dose.      Hyperechoic liver mass  Mildly hyperechoic liver mass noted on the U/S.  May be related to patient's known hepatic adenoma.  MRI Liver recommended by Radiology if further characterization is needed.  Will hold off for now.     Iron deficiency anemia.  Out-pt work-up.    Mild transaminitis.  Unclear etiology.  Recheck in am.       Diet: Moderate Consistent CHO Diet    DVT Prophylaxis: Enoxaparin (Lovenox) SQ  Velasquez Catheter: not present  Code Status: Full Code      Disposition Plan   Expected discharge: Tomorrow, recommended to prior living arrangement once adequate pain management/ tolerating PO medications.  Entered: Macarena Elliott MD 12/07/2019, 7:57 AM       The patient's care was discussed with the Bedside Nurse and Patient.    Macarena Elliott MD  Hospitalist Service  [facility]    ______________________________________________________________________    Interval History     No fevers/chills/N/V. + BM. + RLQ abdominal pain though less intense. Non-radiating pain.    Data reviewed today: I reviewed all medications, new labs and imaging results over the last 24 hours. I personally reviewed no images or EKG's today.    Physical Exam   Vital  Signs: Temp: 98.5  F (36.9  C) Temp src: Oral BP: 122/81 Pulse: 80   Resp: 20 SpO2: 100 % O2 Device: None (Room air)    Weight: 138 lbs 8 oz    Gen - AAO x 3 in NAD.  Lungs - CTA B.  Heart - RR,S1+S2 nml, no m/g/r.  Abd - soft, NT, ND, + BS.  Ext - no edema.    Data   Recent Labs   Lab 12/07/19  0621 12/06/19  0608 12/05/19  1915  12/05/19  1422 12/05/19  0952   WBC 12.5* 11.7*  --   --   --  14.7*   HGB 7.9* 8.3*  --   --  9.9* 9.2*   MCV 69* 68*  --   --   --  68*    436  --   --   --  505*    136 137   < > 135 133   POTASSIUM 3.6 3.7 3.7   < > 4.3 4.1   CHLORIDE 110* 107 108   < > 107 104   CO2 20 20 20   < >  --  19*   BUN 8 9 13   < > 14 17   CR 0.54 0.52 0.58   < >  --  0.66   ANIONGAP 8 9 9   < > 10 10   DB 7.8* 7.7* 7.9*   < >  --  8.7   * 178* 158*   < > 240* 304*   ALBUMIN 2.1*  --   --   --   --  2.9*   PROTTOTAL 6.3*  --   --   --   --  7.6   BILITOTAL 0.3  --   --   --   --  0.2   ALKPHOS 62  --   --   --   --  68   ALT 97*  --   --   --   --  12   AST 92*  --   --   --   --  10   LIPASE  --   --   --   --   --  187    < > = values in this interval not displayed.     No results found for this or any previous visit (from the past 24 hour(s)).  Medications     - MEDICATION INSTRUCTIONS -       sodium chloride 100 mL/hr at 12/07/19 0129       enoxaparin ANTICOAGULANT  40 mg Subcutaneous Q24H     insulin aspart  1-10 Units Subcutaneous TID AC     insulin aspart  1-7 Units Subcutaneous At Bedtime     levothyroxine  125 mcg Oral Daily     lisinopril  5 mg Oral Daily     metFORMIN  1,000 mg Oral BID w/meals     sodium chloride (PF)  3 mL Intracatheter Q8H

## 2019-12-07 NOTE — PLAN OF CARE
Patient A/O x4, up independent in room. Patient c/o abd pain radiating towards her back, medicated with PRN tylenol, dilaudid, and tordol. Patient reported relief. IV fluids infusing per orders. VS stable. B. Tele: SR. Will continue POC.

## 2019-12-08 VITALS
TEMPERATURE: 98.5 F | HEIGHT: 60 IN | SYSTOLIC BLOOD PRESSURE: 126 MMHG | WEIGHT: 138.5 LBS | BODY MASS INDEX: 27.19 KG/M2 | HEART RATE: 79 BPM | DIASTOLIC BLOOD PRESSURE: 84 MMHG | OXYGEN SATURATION: 100 % | RESPIRATION RATE: 16 BRPM

## 2019-12-08 LAB
ALBUMIN SERPL-MCNC: 2.1 G/DL (ref 3.4–5)
ALP SERPL-CCNC: 55 U/L (ref 40–150)
ALT SERPL W P-5'-P-CCNC: 74 U/L (ref 0–50)
ANION GAP SERPL CALCULATED.3IONS-SCNC: 7 MMOL/L (ref 3–14)
AST SERPL W P-5'-P-CCNC: 36 U/L (ref 0–45)
BILIRUB SERPL-MCNC: 0.3 MG/DL (ref 0.2–1.3)
BUN SERPL-MCNC: 11 MG/DL (ref 7–30)
CALCIUM SERPL-MCNC: 8.1 MG/DL (ref 8.5–10.1)
CHLORIDE SERPL-SCNC: 110 MMOL/L (ref 94–109)
CO2 SERPL-SCNC: 22 MMOL/L (ref 20–32)
CREAT SERPL-MCNC: 0.56 MG/DL (ref 0.52–1.04)
GFR SERPL CREATININE-BSD FRML MDRD: >90 ML/MIN/{1.73_M2}
GLUCOSE BLDC GLUCOMTR-MCNC: 139 MG/DL (ref 70–99)
GLUCOSE BLDC GLUCOMTR-MCNC: 146 MG/DL (ref 70–99)
GLUCOSE BLDC GLUCOMTR-MCNC: 153 MG/DL (ref 70–99)
GLUCOSE SERPL-MCNC: 172 MG/DL (ref 70–99)
POTASSIUM SERPL-SCNC: 3.8 MMOL/L (ref 3.4–5.3)
PROT SERPL-MCNC: 6 G/DL (ref 6.8–8.8)
SODIUM SERPL-SCNC: 139 MMOL/L (ref 133–144)

## 2019-12-08 PROCEDURE — 36415 COLL VENOUS BLD VENIPUNCTURE: CPT | Performed by: INTERNAL MEDICINE

## 2019-12-08 PROCEDURE — 25000132 ZZH RX MED GY IP 250 OP 250 PS 637: Performed by: INTERNAL MEDICINE

## 2019-12-08 PROCEDURE — 25000132 ZZH RX MED GY IP 250 OP 250 PS 637: Performed by: PHYSICIAN ASSISTANT

## 2019-12-08 PROCEDURE — 00000146 ZZHCL STATISTIC GLUCOSE BY METER IP

## 2019-12-08 PROCEDURE — 80053 COMPREHEN METABOLIC PANEL: CPT | Performed by: INTERNAL MEDICINE

## 2019-12-08 PROCEDURE — 25000128 H RX IP 250 OP 636: Performed by: INTERNAL MEDICINE

## 2019-12-08 PROCEDURE — 99239 HOSP IP/OBS DSCHRG MGMT >30: CPT | Performed by: INTERNAL MEDICINE

## 2019-12-08 RX ORDER — LEVOTHYROXINE SODIUM 125 UG/1
125 TABLET ORAL DAILY
Qty: 30 TABLET | Refills: 1 | Status: SHIPPED | OUTPATIENT
Start: 2019-12-09

## 2019-12-08 RX ADMIN — LEVOTHYROXINE SODIUM 125 MCG: 125 TABLET ORAL at 08:07

## 2019-12-08 RX ADMIN — ACETAMINOPHEN 650 MG: 325 TABLET, FILM COATED ORAL at 02:28

## 2019-12-08 RX ADMIN — METFORMIN HYDROCHLORIDE 1000 MG: 500 TABLET, FILM COATED ORAL at 08:07

## 2019-12-08 RX ADMIN — LISINOPRIL 5 MG: 5 TABLET ORAL at 08:07

## 2019-12-08 RX ADMIN — HYDROMORPHONE HYDROCHLORIDE 0.5 MG: 1 INJECTION, SOLUTION INTRAMUSCULAR; INTRAVENOUS; SUBCUTANEOUS at 02:28

## 2019-12-08 RX ADMIN — IBUPROFEN 600 MG: 600 TABLET ORAL at 08:07

## 2019-12-08 RX ADMIN — INSULIN ASPART 1 UNITS: 100 INJECTION, SOLUTION INTRAVENOUS; SUBCUTANEOUS at 09:19

## 2019-12-08 ASSESSMENT — ACTIVITIES OF DAILY LIVING (ADL)
ADLS_ACUITY_SCORE: 9

## 2019-12-08 NOTE — DISCHARGE SUMMARY
[facility]  Hospitalist Discharge Summary       Date of Admission:  12/5/2019  Date of Discharge:  12/8/2019 10:35 AM  Discharging Provider: Cipriano Velez MD      Discharge Diagnoses   Diabetic ketoacidosis  Hepatic adenoma  Abdominal pain possibly secondary to diabetic gastroparesis  Hypothyroidism with supratherapeutic free T4    Follow-ups Needed After Discharge   Follow-up Appointments     Follow-up and recommended labs and tests       Follow up with primary physician as scheduled on 12/19.  Check TSH in 1   month.  (levothyxine dose decreased)             Unresulted Labs Ordered in the Past 30 Days of this Admission     Date and Time Order Name Status Description    12/5/2019 1706 Blood culture Preliminary     12/5/2019 1706 Blood culture Preliminary     12/5/2019 1305 T4 free In process           Discharge Disposition   Discharged to home  Condition at discharge: Stable    Hospital Course   Robert Wallace is a 34 year old female with type 2 diabetes mellitus, hypertension, chronic anemia, hypothyroidism, and history of hepatic adenoma who presented to [facility] on 12/5/2019 with sudden onset nausea/vomiting and abdominal pain and was found to have mild DKA.       Diabetic ketoacidosis  -Upon admission bicarb was 19 with a blood glucose of 304 and serum ketones elevated.  -She was treated with IV fluids and IV insulin with excellent response..  -After correction of her blood sugar she is only needing about 3 units a day of sliding scale insulin.  -As an outpatient she had been prescribed Glucotrol XL 10 mg a day and metformin 1000 mg twice a day.  -Hemoglobin A1c was 9.6%  -We were reluctant to start long-term insulin here at this not clear if she needs it and she also reported that she is not able to afford it.  -For now we will continue with Glucotrol XL 10 mg a day and metformin 1000 mg twice a day.  We encourage her to check her blood sugar twice a day and follow-up with her primary care  provider on 12/19 as scheduled to reassess her blood sugar treatment needs.  She is advised that her blood sugar is greater than 300 she should contact her primary care provider.      Abdominal pain, N/V  -CT of the abdomen pelvis and pelvic ultrasounds were unremarkable for any etiology of pain and vomiting.  -Symptoms were consistent with diabetic gastroparesis.  Fortunately they resolve during her hospitalization and she is tolerating oral intake well at time of discharge.  -We discussed the importance of trying to closely monitor and control blood sugar is uncontrolled blood sugars can exacerbate diabetic gastroparesis.         Hyperthyroidism  -Upon admission TSH was low and free T4 was elevated.    -Decreased her levothyroxine dose from 125 mcg a day to 100 mcg a day.   -She will need her TSH rechecked in about a month.      Hyperechoic liver mass  -Mildly hyperechoic liver mass noted on the U/S.  May be related to patient's known hepatic adenoma.  MRI Liver recommended by Radiology if further characterization is needed.    -Defer to primary care for monitoring     Iron deficiency anemia.  -Hemoglobin was 7.9 upon admission with an MCV of 69.  -She had a serum iron level of 14 and iron saturation index of 3%.  -Given her gastrointestinal symptoms we were reluctant to start iron replacement.  -Defer to primary care for further management.     Mild transaminitis.  - Unclear etiology.  Resolved.      Consultations This Hospital Stay   PHARMACY DISCHARGE EDUCATION BY PHARMACIST  CARE COORDINATOR IP CONSULT    Code Status   Full Code    Time Spent on this Encounter   I, Cipriano Velez MD, personally saw the patient today and spent greater than 30 minutes discharging this patient.       Cipriano Velez MD  [facility]  ______________________________________________________________________    Physical Exam   Vital Signs: Temp: 98.5  F (36.9  C) Temp src: Oral BP: 126/84 Pulse: 79   Resp:  16 SpO2: 100 % O2 Device: None (Room air)    Weight: 138 lbs 8 oz       Primary Care Physician   Tiffany Chen    Discharge Orders      Reason for your hospital stay    Diabetic ketoacidosis, abdominal pain     Activity    Your activity upon discharge: activity as tolerated     Discharge Instructions    Check blood glucose in the morning and evening.  Call clinic if 300 or greater.     Follow-up and recommended labs and tests     Follow up with primary physician as scheduled on 12/19.  Check TSH in 1 month.  (levothyxine dose decreased)     Full Code     Diet    Follow this diet upon discharge:       Moderate Consistent CHO Diet       Significant Results and Procedures   Most Recent 3 CBC's:  Recent Labs   Lab Test 12/07/19  0621 12/06/19  0608 12/05/19  1422 12/05/19  0952   WBC 12.5* 11.7*  --  14.7*   HGB 7.9* 8.3* 9.9* 9.2*   MCV 69* 68*  --  68*    436  --  505*     Most Recent 3 BMP's:  Recent Labs   Lab Test 12/08/19  0609 12/07/19  0621 12/06/19  0608    138 136   POTASSIUM 3.8 3.6 3.7   CHLORIDE 110* 110* 107   CO2 22 20 20   BUN 11 8 9   CR 0.56 0.54 0.52   ANIONGAP 7 8 9   DB 8.1* 7.8* 7.7*   * 181* 178*     Most Recent Anemia Panel:  Recent Labs   Lab Test 12/07/19  0621   WBC 12.5*   HGB 7.9*   HCT 27.7*   MCV 69*      IRON 14*   IRONSAT 3*   *   HARSH 11*   ,   Results for orders placed or performed during the hospital encounter of 12/05/19   CT Abdomen Pelvis w/o Contrast    Narrative    CT ABDOMEN AND PELVIS WITHOUT CONTRAST   12/5/2019 10:41 AM     HISTORY: Right-sided flank pain.    TECHNIQUE: No IV contrast material. Radiation dose for this scan was  reduced using automated exposure control, adjustment of the mA and/or  kV according to patient size, or iterative reconstruction technique.    COMPARISON: None.    FINDINGS:    Liver: Previously visualized lesions on MR are not appreciated by  noncontrast CT technique.    Gallbladder: Unremarkable. No pericholecystic  fluid.    Spleen: Unremarkable.    Pancreas: No masses given limitations of noncontrast CT technique. No  peripancreatic inflammation.    Adrenal glands: Normal.    Kidneys: No masses appreciated given limitations of noncontrast  technique. No hyperdense renal stones. No hydronephrosis.    Bowel: No dilated loops of bowel to suggest bowel obstruction.  Appendix appears normal.    Lymph nodes: No enlarged abdominal or pelvic lymph nodes.    Peritoneum: No free fluid. Right-sided Bartholin's gland cyst  measuring 2.2 x 1.5 cm.    Abdominal wall: No hernia identified.    Bones: No suspicious osseous lesions.    Visualized lung bases: Clear.      Impression    IMPRESSION:   1. No acute abnormalities in the abdomen or pelvis to account for the  patient's symptoms. No renal stones or hydronephrosis.  Normal-appearing appendix.  2. Right-sided Bartholin's gland cyst.  3. Previously visualized liver lesions by MR are not appreciated by  noncontrast CT technique.    TREVA CHING MD   Abdomen US, limited (RUQ only)    Narrative    RIGHT UPPER QUADRANT ULTRASOUND December 5, 2019 12:17 PM    HISTORY: Right upper quadrant abdomen pain, vomiting.    COMPARISON: None.    FINDINGS:    Gallbladder: Normal with no cholelithiasis, wall thickening or focal  tenderness.       Bile ducts: CHD is normal diameter. No intrahepatic biliary  dilatation.     Liver: Mildly hyperechoic mass in the right hepatic lobe measuring 4.7  x 5.2 x 3.4 cm. Minimal internal flow by color Doppler. No other liver  masses are appreciated.     Pancreas: Normal     Right kidney: Normal.     Aorta and IVC: Unremarkable.      Impression    IMPRESSION:   1. Mildly hyperechoic mass in the right hepatic lobe measuring 5.2 x  4.7 x 3.4 cm. This lesion is nonspecific and could be further  characterized with liver MRI.  2. Otherwise unremarkable right upper quadrant ultrasound.    TREVA CHING MD   Chest XR,  PA & LAT    Narrative    CHEST TWO VIEWS December 5, 2019  1:46 PM     HISTORY: Right upper quadrant pain.     COMPARISON: Chest CT 9/26/2011.     FINDINGS: Cardiomediastinal silhouette within normal limits. No focal  airspace opacities. No pleural effusion. No pneumothorax identified.  The bones are unremarkable.       Impression    IMPRESSION: No acute cardiopulmonary abnormalities.     TREVA CHING MD   US Pelvis Cmpl wo Transvaginal w Abd/Pel Duplex Lmt    Narrative    US PELVIS COMPLICATIONS WITHOUT TRANSVAGINAL WITH ABDOMEN/PELVIS  DUPLEX LIMITED    12/7/2019 9:21 AM     HISTORY: Right lower quadrant pain.    TECHNIQUE: Transabdominal imaging only was performed. Spectral Doppler  and wave form analysis was also performed to evaluate blood flow to  the ovaries.    COMPARISON: None.    FINDINGS: The uterus is measured at 9.5 x 5.2 x 6.4 cm. No fibroids  are evident. Endometrial stripe measures 0.6 cm and is within normal  limits for a premenopausal patient. The right ovary is unremarkable.  The left ovary is unremarkable. No solid adnexal masses are  identified. No free pelvic fluid is present. Spectral Doppler and  waveform analysis was somewhat limited related to ovarian positioning,  however color Doppler blood flow was detected in both ovaries.       Impression    IMPRESSION: No cause for right lower quadrant pain is identified. No  convincing sonographic evidence for ovarian torsion.    SAW PETERSEN MD       Discharge Medications   Discharge Medication List as of 12/8/2019  9:36 AM      CONTINUE these medications which have CHANGED    Details   levothyroxine (SYNTHROID/LEVOTHROID) 125 MCG tablet Take 1 tablet (125 mcg) by mouth daily, Disp-30 tablet, R-1, E-Prescribe         CONTINUE these medications which have NOT CHANGED    Details   glipiZIDE (GLUCOTROL XL) 10 MG 24 hr tablet Take 10 mg by mouth daily, Historical      ibuprofen (ADVIL,MOTRIN) 400 MG tablet Take 1-2 tablets (400-800 mg) by mouth every 6 hours as needed for other (cramping), Disp-60 tablet,  R-0, E-Prescribe      lisinopril (PRINIVIL/ZESTRIL) 5 MG tablet Take 5 mg by mouth daily, Historical      metFORMIN (GLUCOPHAGE) 1000 MG tablet Take 1,000 mg by mouth 2 times daily (with meals), Historical      FRANKLIN 3-0.02 MG tablet Take 1 tablet by mouth daily, NEEMA, Historical      order for DME Equipment being ordered: [employer] [facility] Fax [phone]    Primary Dressing Endoform   Qty 10  Secondary Dressing 4x4 Gauze Loaf Qty  1  Length of Need: 1 month  Frequency of dressing change: dailyDisp-30 days, R-0, Local Print           Allergies   No Known Allergies

## 2019-12-08 NOTE — PLAN OF CARE
Assumed care from 1900 to 0730  A&Ox4, up ad anthony  LDA: PIV SL  Vitals: stable, RA  Pain: H/A, PRN IV dilaudid & tylenol x2   & 139, mod cho diet  Tele: SR  GI/: no abd pain, voiding  Plan: Home today  Will continue to monitor

## 2019-12-08 NOTE — PLAN OF CARE
Pt alert and oriented X 4. Apical pulse regular, tele SR. Lung sounds clear throughout all lobes. Pt reports headache 8/10, ibuprofen administered, effective. Pt denies abdominal pain, chest pain or shortness of breath. , sliding scale administered.   Plan: pt to discharge today. Pt verbalizes understanding of discharge instructions, medication regimen and follow up care.

## 2019-12-11 LAB
BACTERIA SPEC CULT: NO GROWTH
BACTERIA SPEC CULT: NO GROWTH
Lab: NORMAL
Lab: NORMAL
SPECIMEN SOURCE: NORMAL
SPECIMEN SOURCE: NORMAL

## 2021-10-02 ENCOUNTER — HEALTH MAINTENANCE LETTER (OUTPATIENT)
Age: 36
End: 2021-10-02

## 2022-01-22 ENCOUNTER — HEALTH MAINTENANCE LETTER (OUTPATIENT)
Age: 37
End: 2022-01-22

## 2022-02-17 PROBLEM — E11.10 DKA (DIABETIC KETOACIDOSIS) (H): Status: ACTIVE | Noted: 2019-12-05

## 2022-05-14 ENCOUNTER — HEALTH MAINTENANCE LETTER (OUTPATIENT)
Age: 37
End: 2022-05-14

## 2022-09-03 ENCOUNTER — HEALTH MAINTENANCE LETTER (OUTPATIENT)
Age: 37
End: 2022-09-03

## 2023-01-15 ENCOUNTER — HEALTH MAINTENANCE LETTER (OUTPATIENT)
Age: 38
End: 2023-01-15

## 2023-04-29 ENCOUNTER — HEALTH MAINTENANCE LETTER (OUTPATIENT)
Age: 38
End: 2023-04-29

## 2023-09-30 ENCOUNTER — HEALTH MAINTENANCE LETTER (OUTPATIENT)
Age: 38
End: 2023-09-30

## 2024-02-17 ENCOUNTER — HEALTH MAINTENANCE LETTER (OUTPATIENT)
Age: 39
End: 2024-02-17

## 2025-05-31 ENCOUNTER — APPOINTMENT (OUTPATIENT)
Dept: ULTRASOUND IMAGING | Facility: CLINIC | Age: 40
End: 2025-05-31
Attending: EMERGENCY MEDICINE
Payer: COMMERCIAL

## 2025-05-31 ENCOUNTER — HOSPITAL ENCOUNTER (OUTPATIENT)
Facility: CLINIC | Age: 40
Setting detail: OBSERVATION
Discharge: HOME | End: 2025-06-01
Attending: EMERGENCY MEDICINE | Admitting: INTERNAL MEDICINE
Payer: COMMERCIAL

## 2025-05-31 DIAGNOSIS — E11.65 TYPE 2 DIABETES MELLITUS WITH HYPERGLYCEMIA, UNSPECIFIED WHETHER LONG TERM INSULIN USE (H): ICD-10-CM

## 2025-05-31 DIAGNOSIS — K80.20 SYMPTOMATIC CHOLELITHIASIS: ICD-10-CM

## 2025-05-31 LAB
ALBUMIN SERPL BCG-MCNC: 3.6 G/DL (ref 3.5–5.2)
ALBUMIN UR-MCNC: 10 MG/DL
ALP SERPL-CCNC: 92 U/L (ref 40–150)
ALT SERPL W P-5'-P-CCNC: 71 U/L (ref 0–50)
ANION GAP SERPL CALCULATED.3IONS-SCNC: 15 MMOL/L (ref 7–15)
APPEARANCE UR: CLEAR
AST SERPL W P-5'-P-CCNC: 70 U/L (ref 0–45)
B-OH-BUTYR SERPL-SCNC: 0.3 MMOL/L
BASE EXCESS BLDV CALC-SCNC: -3.9 MMOL/L (ref -3–3)
BASOPHILS # BLD AUTO: 0.1 10E3/UL (ref 0–0.2)
BASOPHILS NFR BLD AUTO: 1 %
BILIRUB SERPL-MCNC: <0.2 MG/DL
BILIRUB UR QL STRIP: NEGATIVE
BUN SERPL-MCNC: 14.1 MG/DL (ref 6–20)
CALCIUM SERPL-MCNC: 9.2 MG/DL (ref 8.8–10.4)
CHLORIDE SERPL-SCNC: 100 MMOL/L (ref 98–107)
COLOR UR AUTO: ABNORMAL
CREAT SERPL-MCNC: 0.75 MG/DL (ref 0.51–0.95)
EGFRCR SERPLBLD CKD-EPI 2021: >90 ML/MIN/1.73M2
EOSINOPHIL # BLD AUTO: 0.5 10E3/UL (ref 0–0.7)
EOSINOPHIL NFR BLD AUTO: 4 %
ERYTHROCYTE [DISTWIDTH] IN BLOOD BY AUTOMATED COUNT: 14 % (ref 10–15)
GLUCOSE SERPL-MCNC: 315 MG/DL (ref 70–99)
GLUCOSE UR STRIP-MCNC: >=1000 MG/DL
HCG SERPL QL: NEGATIVE
HCO3 BLDV-SCNC: 22 MMOL/L (ref 21–28)
HCO3 SERPL-SCNC: 21 MMOL/L (ref 22–29)
HCT VFR BLD AUTO: 32.5 % (ref 35–47)
HGB BLD-MCNC: 10.7 G/DL (ref 11.7–15.7)
HGB UR QL STRIP: NEGATIVE
HOLD SPECIMEN: NORMAL
HOLD SPECIMEN: NORMAL
IMM GRANULOCYTES # BLD: 0.1 10E3/UL
IMM GRANULOCYTES NFR BLD: 1 %
KETONES UR STRIP-MCNC: NEGATIVE MG/DL
LEUKOCYTE ESTERASE UR QL STRIP: NEGATIVE
LIPASE SERPL-CCNC: 70 U/L (ref 13–60)
LYMPHOCYTES # BLD AUTO: 3.1 10E3/UL (ref 0.8–5.3)
LYMPHOCYTES NFR BLD AUTO: 24 %
MCH RBC QN AUTO: 26.4 PG (ref 26.5–33)
MCHC RBC AUTO-ENTMCNC: 32.9 G/DL (ref 31.5–36.5)
MCV RBC AUTO: 80 FL (ref 78–100)
MONOCYTES # BLD AUTO: 0.9 10E3/UL (ref 0–1.3)
MONOCYTES NFR BLD AUTO: 7 %
MUCOUS THREADS #/AREA URNS LPF: PRESENT /LPF
NEUTROPHILS # BLD AUTO: 8.1 10E3/UL (ref 1.6–8.3)
NEUTROPHILS NFR BLD AUTO: 63 %
NITRATE UR QL: NEGATIVE
NRBC # BLD AUTO: 0 10E3/UL
NRBC BLD AUTO-RTO: 0 /100
O2/TOTAL GAS SETTING VFR VENT: 21 %
OXYHGB MFR BLDV: 12 % (ref 70–75)
PCO2 BLDV: 43 MM HG (ref 40–50)
PH BLDV: 7.32 [PH] (ref 7.32–7.43)
PH UR STRIP: 7 [PH] (ref 5–7)
PLATELET # BLD AUTO: 511 10E3/UL (ref 150–450)
PO2 BLDV: 14 MM HG (ref 25–47)
POTASSIUM SERPL-SCNC: 4.3 MMOL/L (ref 3.4–5.3)
PROT SERPL-MCNC: 7 G/DL (ref 6.4–8.3)
RBC # BLD AUTO: 4.06 10E6/UL (ref 3.8–5.2)
RBC URINE: <1 /HPF
SAO2 % BLDV: 12.5 % (ref 70–75)
SODIUM SERPL-SCNC: 136 MMOL/L (ref 135–145)
SP GR UR STRIP: 1.02 (ref 1–1.03)
SQUAMOUS EPITHELIAL: <1 /HPF
UROBILINOGEN UR STRIP-MCNC: NORMAL MG/DL
WBC # BLD AUTO: 12.8 10E3/UL (ref 4–11)
WBC URINE: 0 /HPF

## 2025-05-31 PROCEDURE — 76705 ECHO EXAM OF ABDOMEN: CPT

## 2025-05-31 PROCEDURE — 83690 ASSAY OF LIPASE: CPT | Performed by: EMERGENCY MEDICINE

## 2025-05-31 PROCEDURE — 258N000003 HC RX IP 258 OP 636: Performed by: EMERGENCY MEDICINE

## 2025-05-31 PROCEDURE — 99222 1ST HOSP IP/OBS MODERATE 55: CPT | Performed by: INTERNAL MEDICINE

## 2025-05-31 PROCEDURE — 85025 COMPLETE CBC W/AUTO DIFF WBC: CPT | Performed by: EMERGENCY MEDICINE

## 2025-05-31 PROCEDURE — 81001 URINALYSIS AUTO W/SCOPE: CPT | Performed by: EMERGENCY MEDICINE

## 2025-05-31 PROCEDURE — 84703 CHORIONIC GONADOTROPIN ASSAY: CPT | Performed by: EMERGENCY MEDICINE

## 2025-05-31 PROCEDURE — 80053 COMPREHEN METABOLIC PANEL: CPT | Performed by: EMERGENCY MEDICINE

## 2025-05-31 PROCEDURE — 82805 BLOOD GASES W/O2 SATURATION: CPT | Performed by: EMERGENCY MEDICINE

## 2025-05-31 PROCEDURE — 96374 THER/PROPH/DIAG INJ IV PUSH: CPT | Mod: 59

## 2025-05-31 PROCEDURE — 82010 KETONE BODYS QUAN: CPT | Performed by: EMERGENCY MEDICINE

## 2025-05-31 PROCEDURE — 250N000011 HC RX IP 250 OP 636: Mod: JZ | Performed by: EMERGENCY MEDICINE

## 2025-05-31 PROCEDURE — 96375 TX/PRO/DX INJ NEW DRUG ADDON: CPT | Mod: 59

## 2025-05-31 PROCEDURE — 36415 COLL VENOUS BLD VENIPUNCTURE: CPT | Performed by: EMERGENCY MEDICINE

## 2025-05-31 PROCEDURE — 96361 HYDRATE IV INFUSION ADD-ON: CPT

## 2025-05-31 PROCEDURE — 99285 EMERGENCY DEPT VISIT HI MDM: CPT | Mod: 25

## 2025-05-31 RX ORDER — MORPHINE SULFATE 4 MG/ML
4 INJECTION, SOLUTION INTRAMUSCULAR; INTRAVENOUS
Refills: 0 | Status: DISCONTINUED | OUTPATIENT
Start: 2025-05-31 | End: 2025-06-01

## 2025-05-31 RX ORDER — KETOROLAC TROMETHAMINE 15 MG/ML
15 INJECTION, SOLUTION INTRAMUSCULAR; INTRAVENOUS ONCE
Status: COMPLETED | OUTPATIENT
Start: 2025-05-31 | End: 2025-05-31

## 2025-05-31 RX ORDER — MORPHINE SULFATE 4 MG/ML
4 INJECTION, SOLUTION INTRAMUSCULAR; INTRAVENOUS ONCE
Refills: 0 | Status: COMPLETED | OUTPATIENT
Start: 2025-05-31 | End: 2025-05-31

## 2025-05-31 RX ORDER — ONDANSETRON 2 MG/ML
4 INJECTION INTRAMUSCULAR; INTRAVENOUS ONCE
Status: COMPLETED | OUTPATIENT
Start: 2025-05-31 | End: 2025-05-31

## 2025-05-31 RX ADMIN — MORPHINE SULFATE 4 MG: 4 INJECTION, SOLUTION INTRAMUSCULAR; INTRAVENOUS at 21:54

## 2025-05-31 RX ADMIN — SODIUM CHLORIDE 1000 ML: 0.9 INJECTION, SOLUTION INTRAVENOUS at 21:40

## 2025-05-31 RX ADMIN — KETOROLAC TROMETHAMINE 15 MG: 15 INJECTION, SOLUTION INTRAMUSCULAR; INTRAVENOUS at 23:02

## 2025-05-31 RX ADMIN — ONDANSETRON 4 MG: 2 INJECTION, SOLUTION INTRAMUSCULAR; INTRAVENOUS at 21:54

## 2025-05-31 ASSESSMENT — ACTIVITIES OF DAILY LIVING (ADL)
ADLS_ACUITY_SCORE: 46
ADLS_ACUITY_SCORE: 46

## 2025-05-31 ASSESSMENT — COLUMBIA-SUICIDE SEVERITY RATING SCALE - C-SSRS
2. HAVE YOU ACTUALLY HAD ANY THOUGHTS OF KILLING YOURSELF IN THE PAST MONTH?: NO
6. HAVE YOU EVER DONE ANYTHING, STARTED TO DO ANYTHING, OR PREPARED TO DO ANYTHING TO END YOUR LIFE?: NO
1. IN THE PAST MONTH, HAVE YOU WISHED YOU WERE DEAD OR WISHED YOU COULD GO TO SLEEP AND NOT WAKE UP?: NO

## 2025-06-01 ENCOUNTER — ANESTHESIA EVENT (OUTPATIENT)
Dept: SURGERY | Facility: CLINIC | Age: 40
End: 2025-06-01
Payer: COMMERCIAL

## 2025-06-01 ENCOUNTER — HEALTH MAINTENANCE LETTER (OUTPATIENT)
Age: 40
End: 2025-06-01

## 2025-06-01 ENCOUNTER — APPOINTMENT (OUTPATIENT)
Dept: GENERAL RADIOLOGY | Facility: CLINIC | Age: 40
End: 2025-06-01
Attending: SURGERY
Payer: COMMERCIAL

## 2025-06-01 ENCOUNTER — ANESTHESIA (OUTPATIENT)
Dept: SURGERY | Facility: CLINIC | Age: 40
End: 2025-06-01
Payer: COMMERCIAL

## 2025-06-01 VITALS
OXYGEN SATURATION: 96 % | TEMPERATURE: 96.9 F | HEIGHT: 61 IN | WEIGHT: 130.1 LBS | BODY MASS INDEX: 24.56 KG/M2 | SYSTOLIC BLOOD PRESSURE: 101 MMHG | HEART RATE: 86 BPM | DIASTOLIC BLOOD PRESSURE: 72 MMHG | RESPIRATION RATE: 14 BRPM

## 2025-06-01 PROBLEM — K80.20 SYMPTOMATIC CHOLELITHIASIS: Status: ACTIVE | Noted: 2025-06-01

## 2025-06-01 PROBLEM — E11.65 TYPE 2 DIABETES MELLITUS WITH HYPERGLYCEMIA, UNSPECIFIED WHETHER LONG TERM INSULIN USE (H): Status: ACTIVE | Noted: 2025-06-01

## 2025-06-01 LAB
ALBUMIN SERPL BCG-MCNC: 3.3 G/DL (ref 3.5–5.2)
ALP SERPL-CCNC: 86 U/L (ref 40–150)
ALT SERPL W P-5'-P-CCNC: 179 U/L (ref 0–50)
ANION GAP SERPL CALCULATED.3IONS-SCNC: 11 MMOL/L (ref 7–15)
AST SERPL W P-5'-P-CCNC: 218 U/L (ref 0–45)
BILIRUB SERPL-MCNC: <0.2 MG/DL
BUN SERPL-MCNC: 13.9 MG/DL (ref 6–20)
CALCIUM SERPL-MCNC: 8.6 MG/DL (ref 8.8–10.4)
CHLORIDE SERPL-SCNC: 106 MMOL/L (ref 98–107)
CREAT SERPL-MCNC: 0.74 MG/DL (ref 0.51–0.95)
EGFRCR SERPLBLD CKD-EPI 2021: >90 ML/MIN/1.73M2
ERYTHROCYTE [DISTWIDTH] IN BLOOD BY AUTOMATED COUNT: 14.2 % (ref 10–15)
GLUCOSE BLDC GLUCOMTR-MCNC: 125 MG/DL (ref 70–99)
GLUCOSE BLDC GLUCOMTR-MCNC: 194 MG/DL (ref 70–99)
GLUCOSE BLDC GLUCOMTR-MCNC: 230 MG/DL (ref 70–99)
GLUCOSE SERPL-MCNC: 146 MG/DL (ref 70–99)
HCO3 SERPL-SCNC: 21 MMOL/L (ref 22–29)
HCT VFR BLD AUTO: 31.4 % (ref 35–47)
HGB BLD-MCNC: 10 G/DL (ref 11.7–15.7)
MCH RBC QN AUTO: 25.9 PG (ref 26.5–33)
MCHC RBC AUTO-ENTMCNC: 31.8 G/DL (ref 31.5–36.5)
MCV RBC AUTO: 81 FL (ref 78–100)
PLATELET # BLD AUTO: 473 10E3/UL (ref 150–450)
POTASSIUM SERPL-SCNC: 4.3 MMOL/L (ref 3.4–5.3)
PROT SERPL-MCNC: 6.4 G/DL (ref 6.4–8.3)
RBC # BLD AUTO: 3.86 10E6/UL (ref 3.8–5.2)
SODIUM SERPL-SCNC: 138 MMOL/L (ref 135–145)
WBC # BLD AUTO: 12.8 10E3/UL (ref 4–11)

## 2025-06-01 PROCEDURE — 370N000017 HC ANESTHESIA TECHNICAL FEE, PER MIN: Performed by: SURGERY

## 2025-06-01 PROCEDURE — 999N000141 HC STATISTIC PRE-PROCEDURE NURSING ASSESSMENT: Performed by: SURGERY

## 2025-06-01 PROCEDURE — 250N000011 HC RX IP 250 OP 636: Mod: JZ | Performed by: INTERNAL MEDICINE

## 2025-06-01 PROCEDURE — 258N000003 HC RX IP 258 OP 636: Performed by: NURSE ANESTHETIST, CERTIFIED REGISTERED

## 2025-06-01 PROCEDURE — 47563 LAPARO CHOLECYSTECTOMY/GRAPH: CPT | Performed by: SURGERY

## 2025-06-01 PROCEDURE — 360N000083 HC SURGERY LEVEL 3 W/ FLUORO, PER MIN: Performed by: SURGERY

## 2025-06-01 PROCEDURE — 250N000009 HC RX 250: Performed by: NURSE ANESTHETIST, CERTIFIED REGISTERED

## 2025-06-01 PROCEDURE — 250N000013 HC RX MED GY IP 250 OP 250 PS 637: Performed by: INTERNAL MEDICINE

## 2025-06-01 PROCEDURE — 82962 GLUCOSE BLOOD TEST: CPT

## 2025-06-01 PROCEDURE — 99204 OFFICE O/P NEW MOD 45 MIN: CPT | Mod: 57 | Performed by: SURGERY

## 2025-06-01 PROCEDURE — 96376 TX/PRO/DX INJ SAME DRUG ADON: CPT

## 2025-06-01 PROCEDURE — 250N000025 HC SEVOFLURANE, PER MIN: Performed by: SURGERY

## 2025-06-01 PROCEDURE — 84132 ASSAY OF SERUM POTASSIUM: CPT | Performed by: INTERNAL MEDICINE

## 2025-06-01 PROCEDURE — 99239 HOSP IP/OBS DSCHRG MGMT >30: CPT | Performed by: HOSPITALIST

## 2025-06-01 PROCEDURE — 250N000011 HC RX IP 250 OP 636: Performed by: NURSE ANESTHETIST, CERTIFIED REGISTERED

## 2025-06-01 PROCEDURE — 710N000012 HC RECOVERY PHASE 2, PER MINUTE: Performed by: SURGERY

## 2025-06-01 PROCEDURE — 47563 LAPARO CHOLECYSTECTOMY/GRAPH: CPT | Mod: AS | Performed by: PHYSICIAN ASSISTANT

## 2025-06-01 PROCEDURE — 710N000009 HC RECOVERY PHASE 1, LEVEL 1, PER MIN: Performed by: SURGERY

## 2025-06-01 PROCEDURE — 250N000011 HC RX IP 250 OP 636: Performed by: SURGERY

## 2025-06-01 PROCEDURE — 36415 COLL VENOUS BLD VENIPUNCTURE: CPT | Performed by: INTERNAL MEDICINE

## 2025-06-01 PROCEDURE — G0378 HOSPITAL OBSERVATION PER HR: HCPCS

## 2025-06-01 PROCEDURE — 85014 HEMATOCRIT: CPT | Performed by: INTERNAL MEDICINE

## 2025-06-01 PROCEDURE — 999N000179 XR SURGERY CARM FLUORO LESS THAN 5 MIN W STILLS

## 2025-06-01 PROCEDURE — 250N000009 HC RX 250: Performed by: SURGERY

## 2025-06-01 PROCEDURE — 88304 TISSUE EXAM BY PATHOLOGIST: CPT | Mod: TC | Performed by: SURGERY

## 2025-06-01 PROCEDURE — 272N000001 HC OR GENERAL SUPPLY STERILE: Performed by: SURGERY

## 2025-06-01 PROCEDURE — 88304 TISSUE EXAM BY PATHOLOGIST: CPT | Mod: 26 | Performed by: PATHOLOGY

## 2025-06-01 PROCEDURE — 258N000001 HC RX 258: Performed by: SURGERY

## 2025-06-01 PROCEDURE — 255N000002 HC RX 255 OP 636: Performed by: SURGERY

## 2025-06-01 RX ORDER — POLYETHYLENE GLYCOL 3350 17 G/17G
17 POWDER, FOR SOLUTION ORAL DAILY
Status: DISCONTINUED | OUTPATIENT
Start: 2025-06-01 | End: 2025-06-01 | Stop reason: HOSPADM

## 2025-06-01 RX ORDER — LISINOPRIL 5 MG/1
5 TABLET ORAL DAILY
Status: DISCONTINUED | OUTPATIENT
Start: 2025-06-01 | End: 2025-06-01 | Stop reason: HOSPADM

## 2025-06-01 RX ORDER — ONDANSETRON 4 MG/1
4 TABLET, ORALLY DISINTEGRATING ORAL EVERY 30 MIN PRN
Status: DISCONTINUED | OUTPATIENT
Start: 2025-06-01 | End: 2025-06-01 | Stop reason: HOSPADM

## 2025-06-01 RX ORDER — OXYCODONE HYDROCHLORIDE 5 MG/1
5 TABLET ORAL EVERY 4 HOURS PRN
Refills: 0 | Status: DISCONTINUED | OUTPATIENT
Start: 2025-06-01 | End: 2025-06-01 | Stop reason: HOSPADM

## 2025-06-01 RX ORDER — SODIUM CHLORIDE, SODIUM LACTATE, POTASSIUM CHLORIDE, CALCIUM CHLORIDE 600; 310; 30; 20 MG/100ML; MG/100ML; MG/100ML; MG/100ML
INJECTION, SOLUTION INTRAVENOUS CONTINUOUS PRN
Status: DISCONTINUED | OUTPATIENT
Start: 2025-06-01 | End: 2025-06-01

## 2025-06-01 RX ORDER — HYDRALAZINE HYDROCHLORIDE 20 MG/ML
10 INJECTION INTRAMUSCULAR; INTRAVENOUS EVERY 10 MIN PRN
Status: DISCONTINUED | OUTPATIENT
Start: 2025-06-01 | End: 2025-06-01 | Stop reason: HOSPADM

## 2025-06-01 RX ORDER — ALBUTEROL SULFATE 0.83 MG/ML
2.5 SOLUTION RESPIRATORY (INHALATION) EVERY 4 HOURS PRN
Status: DISCONTINUED | OUTPATIENT
Start: 2025-06-01 | End: 2025-06-01 | Stop reason: HOSPADM

## 2025-06-01 RX ORDER — CEFAZOLIN SODIUM 2 G/50ML
2 SOLUTION INTRAVENOUS
Status: COMPLETED | OUTPATIENT
Start: 2025-06-01 | End: 2025-06-01

## 2025-06-01 RX ORDER — KETOROLAC TROMETHAMINE 30 MG/ML
INJECTION, SOLUTION INTRAMUSCULAR; INTRAVENOUS PRN
Status: DISCONTINUED | OUTPATIENT
Start: 2025-06-01 | End: 2025-06-01

## 2025-06-01 RX ORDER — BUPROPION HYDROCHLORIDE 150 MG/1
150 TABLET ORAL DAILY
COMMUNITY

## 2025-06-01 RX ORDER — NALOXONE HYDROCHLORIDE 0.4 MG/ML
0.1 INJECTION, SOLUTION INTRAMUSCULAR; INTRAVENOUS; SUBCUTANEOUS
Status: DISCONTINUED | OUTPATIENT
Start: 2025-06-01 | End: 2025-06-01 | Stop reason: HOSPADM

## 2025-06-01 RX ORDER — AMOXICILLIN 250 MG
2 CAPSULE ORAL 2 TIMES DAILY PRN
Status: DISCONTINUED | OUTPATIENT
Start: 2025-06-01 | End: 2025-06-01 | Stop reason: HOSPADM

## 2025-06-01 RX ORDER — DEXAMETHASONE SODIUM PHOSPHATE 4 MG/ML
4 INJECTION, SOLUTION INTRA-ARTICULAR; INTRALESIONAL; INTRAMUSCULAR; INTRAVENOUS; SOFT TISSUE
Status: DISCONTINUED | OUTPATIENT
Start: 2025-06-01 | End: 2025-06-01 | Stop reason: HOSPADM

## 2025-06-01 RX ORDER — CEFAZOLIN SODIUM 2 G/50ML
2 SOLUTION INTRAVENOUS SEE ADMIN INSTRUCTIONS
Status: DISCONTINUED | OUTPATIENT
Start: 2025-06-01 | End: 2025-06-01 | Stop reason: HOSPADM

## 2025-06-01 RX ORDER — DROSPIRENONE AND ETHINYL ESTRADIOL 0.02-3(28)
1 KIT ORAL DAILY
COMMUNITY

## 2025-06-01 RX ORDER — ACETAMINOPHEN 650 MG/1
650 SUPPOSITORY RECTAL EVERY 4 HOURS PRN
Status: DISCONTINUED | OUTPATIENT
Start: 2025-06-01 | End: 2025-06-01 | Stop reason: HOSPADM

## 2025-06-01 RX ORDER — IBUPROFEN 400 MG/1
400-800 TABLET, FILM COATED ORAL EVERY 6 HOURS PRN
Status: DISCONTINUED | OUTPATIENT
Start: 2025-06-01 | End: 2025-06-01 | Stop reason: HOSPADM

## 2025-06-01 RX ORDER — ONDANSETRON 2 MG/ML
INJECTION INTRAMUSCULAR; INTRAVENOUS PRN
Status: DISCONTINUED | OUTPATIENT
Start: 2025-06-01 | End: 2025-06-01

## 2025-06-01 RX ORDER — ONDANSETRON 2 MG/ML
4 INJECTION INTRAMUSCULAR; INTRAVENOUS EVERY 30 MIN PRN
Status: DISCONTINUED | OUTPATIENT
Start: 2025-06-01 | End: 2025-06-01 | Stop reason: HOSPADM

## 2025-06-01 RX ORDER — AMOXICILLIN 250 MG
1 CAPSULE ORAL 2 TIMES DAILY PRN
Status: DISCONTINUED | OUTPATIENT
Start: 2025-06-01 | End: 2025-06-01 | Stop reason: HOSPADM

## 2025-06-01 RX ORDER — EPHEDRINE SULFATE 50 MG/ML
INJECTION, SOLUTION INTRAMUSCULAR; INTRAVENOUS; SUBCUTANEOUS PRN
Status: DISCONTINUED | OUTPATIENT
Start: 2025-06-01 | End: 2025-06-01

## 2025-06-01 RX ORDER — LIDOCAINE HYDROCHLORIDE 20 MG/ML
INJECTION, SOLUTION INFILTRATION; PERINEURAL PRN
Status: DISCONTINUED | OUTPATIENT
Start: 2025-06-01 | End: 2025-06-01

## 2025-06-01 RX ORDER — PROCHLORPERAZINE MALEATE 5 MG/1
10 TABLET ORAL EVERY 6 HOURS PRN
Status: DISCONTINUED | OUTPATIENT
Start: 2025-06-01 | End: 2025-06-01 | Stop reason: HOSPADM

## 2025-06-01 RX ORDER — NALOXONE HYDROCHLORIDE 0.4 MG/ML
0.4 INJECTION, SOLUTION INTRAMUSCULAR; INTRAVENOUS; SUBCUTANEOUS
Status: DISCONTINUED | OUTPATIENT
Start: 2025-06-01 | End: 2025-06-01 | Stop reason: HOSPADM

## 2025-06-01 RX ORDER — METHADONE HYDROCHLORIDE 10 MG/ML
INJECTION, SOLUTION INTRAMUSCULAR; INTRAVENOUS; SUBCUTANEOUS PRN
Status: DISCONTINUED | OUTPATIENT
Start: 2025-06-01 | End: 2025-06-01

## 2025-06-01 RX ORDER — ACETAMINOPHEN 325 MG/1
650 TABLET ORAL EVERY 4 HOURS PRN
Status: DISCONTINUED | OUTPATIENT
Start: 2025-06-01 | End: 2025-06-01 | Stop reason: HOSPADM

## 2025-06-01 RX ORDER — ONDANSETRON 2 MG/ML
4 INJECTION INTRAMUSCULAR; INTRAVENOUS EVERY 6 HOURS PRN
Status: DISCONTINUED | OUTPATIENT
Start: 2025-06-01 | End: 2025-06-01 | Stop reason: HOSPADM

## 2025-06-01 RX ORDER — MORPHINE SULFATE 2 MG/ML
2 INJECTION, SOLUTION INTRAMUSCULAR; INTRAVENOUS
Refills: 0 | Status: DISCONTINUED | OUTPATIENT
Start: 2025-06-01 | End: 2025-06-01 | Stop reason: HOSPADM

## 2025-06-01 RX ORDER — NALOXONE HYDROCHLORIDE 0.4 MG/ML
0.2 INJECTION, SOLUTION INTRAMUSCULAR; INTRAVENOUS; SUBCUTANEOUS
Status: DISCONTINUED | OUTPATIENT
Start: 2025-06-01 | End: 2025-06-01 | Stop reason: HOSPADM

## 2025-06-01 RX ORDER — KETOROLAC TROMETHAMINE 15 MG/ML
15 INJECTION, SOLUTION INTRAMUSCULAR; INTRAVENOUS
Status: DISCONTINUED | OUTPATIENT
Start: 2025-06-01 | End: 2025-06-01 | Stop reason: HOSPADM

## 2025-06-01 RX ORDER — ONDANSETRON 4 MG/1
4 TABLET, ORALLY DISINTEGRATING ORAL EVERY 8 HOURS PRN
Qty: 10 TABLET | Refills: 0 | Status: SHIPPED | OUTPATIENT
Start: 2025-06-01

## 2025-06-01 RX ORDER — METHADONE HYDROCHLORIDE 10 MG/ML
2 INJECTION, SOLUTION INTRAMUSCULAR; INTRAVENOUS; SUBCUTANEOUS 3 TIMES DAILY PRN
Refills: 0 | Status: DISCONTINUED | OUTPATIENT
Start: 2025-06-01 | End: 2025-06-01 | Stop reason: HOSPADM

## 2025-06-01 RX ORDER — DEXAMETHASONE SODIUM PHOSPHATE 4 MG/ML
INJECTION, SOLUTION INTRA-ARTICULAR; INTRALESIONAL; INTRAMUSCULAR; INTRAVENOUS; SOFT TISSUE PRN
Status: DISCONTINUED | OUTPATIENT
Start: 2025-06-01 | End: 2025-06-01

## 2025-06-01 RX ORDER — HYDROMORPHONE HYDROCHLORIDE 4 MG/ML
INJECTION, SOLUTION INTRAMUSCULAR; INTRAVENOUS; SUBCUTANEOUS PRN
Status: DISCONTINUED | OUTPATIENT
Start: 2025-06-01 | End: 2025-06-01

## 2025-06-01 RX ORDER — ONDANSETRON 4 MG/1
4 TABLET, ORALLY DISINTEGRATING ORAL EVERY 6 HOURS PRN
Status: DISCONTINUED | OUTPATIENT
Start: 2025-06-01 | End: 2025-06-01 | Stop reason: HOSPADM

## 2025-06-01 RX ORDER — SODIUM CHLORIDE, SODIUM LACTATE, POTASSIUM CHLORIDE, CALCIUM CHLORIDE 600; 310; 30; 20 MG/100ML; MG/100ML; MG/100ML; MG/100ML
INJECTION, SOLUTION INTRAVENOUS CONTINUOUS
Status: DISCONTINUED | OUTPATIENT
Start: 2025-06-01 | End: 2025-06-01 | Stop reason: HOSPADM

## 2025-06-01 RX ORDER — BUPIVACAINE HYDROCHLORIDE AND EPINEPHRINE 5; 5 MG/ML; UG/ML
INJECTION, SOLUTION PERINEURAL PRN
Status: DISCONTINUED | OUTPATIENT
Start: 2025-06-01 | End: 2025-06-01 | Stop reason: HOSPADM

## 2025-06-01 RX ORDER — MORPHINE SULFATE 2 MG/ML
1 INJECTION, SOLUTION INTRAMUSCULAR; INTRAVENOUS
Refills: 0 | Status: DISCONTINUED | OUTPATIENT
Start: 2025-06-01 | End: 2025-06-01 | Stop reason: HOSPADM

## 2025-06-01 RX ORDER — OXYCODONE HYDROCHLORIDE 5 MG/1
5 TABLET ORAL
Status: DISCONTINUED | OUTPATIENT
Start: 2025-06-01 | End: 2025-06-01 | Stop reason: HOSPADM

## 2025-06-01 RX ORDER — LEVOTHYROXINE SODIUM 125 UG/1
125 TABLET ORAL DAILY
Status: DISCONTINUED | OUTPATIENT
Start: 2025-06-01 | End: 2025-06-01 | Stop reason: HOSPADM

## 2025-06-01 RX ORDER — OXYCODONE HYDROCHLORIDE 5 MG/1
5-10 TABLET ORAL EVERY 4 HOURS PRN
Qty: 20 TABLET | Refills: 0 | Status: SHIPPED | OUTPATIENT
Start: 2025-06-01

## 2025-06-01 RX ORDER — MAGNESIUM SULFATE HEPTAHYDRATE 40 MG/ML
2 INJECTION, SOLUTION INTRAVENOUS
Status: DISCONTINUED | OUTPATIENT
Start: 2025-06-01 | End: 2025-06-01 | Stop reason: HOSPADM

## 2025-06-01 RX ORDER — INDOCYANINE GREEN AND WATER 25 MG
2.5 KIT INJECTION ONCE
Status: COMPLETED | OUTPATIENT
Start: 2025-06-01 | End: 2025-06-01

## 2025-06-01 RX ORDER — LABETALOL HYDROCHLORIDE 5 MG/ML
10 INJECTION, SOLUTION INTRAVENOUS
Status: DISCONTINUED | OUTPATIENT
Start: 2025-06-01 | End: 2025-06-01 | Stop reason: HOSPADM

## 2025-06-01 RX ORDER — PROPOFOL 10 MG/ML
INJECTION, EMULSION INTRAVENOUS PRN
Status: DISCONTINUED | OUTPATIENT
Start: 2025-06-01 | End: 2025-06-01

## 2025-06-01 RX ADMIN — ROCURONIUM BROMIDE 50 MG: 50 INJECTION, SOLUTION INTRAVENOUS at 13:01

## 2025-06-01 RX ADMIN — ONDANSETRON 4 MG: 2 INJECTION INTRAMUSCULAR; INTRAVENOUS at 13:01

## 2025-06-01 RX ADMIN — DEXAMETHASONE SODIUM PHOSPHATE 8 MG: 4 INJECTION, SOLUTION INTRA-ARTICULAR; INTRALESIONAL; INTRAMUSCULAR; INTRAVENOUS; SOFT TISSUE at 13:01

## 2025-06-01 RX ADMIN — OXYCODONE HYDROCHLORIDE 5 MG: 5 TABLET ORAL at 02:50

## 2025-06-01 RX ADMIN — KETOROLAC TROMETHAMINE 15 MG: 30 INJECTION, SOLUTION INTRAMUSCULAR at 13:01

## 2025-06-01 RX ADMIN — PHENYLEPHRINE HYDROCHLORIDE 300 MCG: 10 INJECTION INTRAVENOUS at 13:28

## 2025-06-01 RX ADMIN — LISINOPRIL 5 MG: 5 TABLET ORAL at 09:38

## 2025-06-01 RX ADMIN — PHENYLEPHRINE HYDROCHLORIDE 300 MCG: 10 INJECTION INTRAVENOUS at 13:30

## 2025-06-01 RX ADMIN — MIDAZOLAM 2 MG: 1 INJECTION INTRAMUSCULAR; INTRAVENOUS at 12:57

## 2025-06-01 RX ADMIN — MORPHINE SULFATE 1 MG: 2 INJECTION, SOLUTION INTRAMUSCULAR; INTRAVENOUS at 05:42

## 2025-06-01 RX ADMIN — DEXMEDETOMIDINE HYDROCHLORIDE 0.5 MCG/KG/HR: 100 INJECTION, SOLUTION INTRAVENOUS at 13:01

## 2025-06-01 RX ADMIN — PHENYLEPHRINE HYDROCHLORIDE 200 MCG: 10 INJECTION INTRAVENOUS at 13:27

## 2025-06-01 RX ADMIN — MORPHINE SULFATE 2 MG: 2 INJECTION, SOLUTION INTRAMUSCULAR; INTRAVENOUS at 08:12

## 2025-06-01 RX ADMIN — INDOCYANINE GREEN AND WATER 2.5 MG: KIT at 10:48

## 2025-06-01 RX ADMIN — PHENYLEPHRINE HYDROCHLORIDE 100 MCG: 10 INJECTION INTRAVENOUS at 13:16

## 2025-06-01 RX ADMIN — ROCURONIUM BROMIDE 10 MG: 50 INJECTION, SOLUTION INTRAVENOUS at 13:23

## 2025-06-01 RX ADMIN — HYDROMORPHONE HYDROCHLORIDE 1 MG: 1 INJECTION, SOLUTION INTRAMUSCULAR; INTRAVENOUS; SUBCUTANEOUS at 13:12

## 2025-06-01 RX ADMIN — LEVOTHYROXINE SODIUM 125 MCG: 0.12 TABLET ORAL at 09:38

## 2025-06-01 RX ADMIN — PHENYLEPHRINE HYDROCHLORIDE 200 MCG: 10 INJECTION INTRAVENOUS at 13:19

## 2025-06-01 RX ADMIN — EPHEDRINE SULFATE 10 MG: 5 INJECTION INTRAVENOUS at 13:35

## 2025-06-01 RX ADMIN — NOREPINEPHRINE BITARTRATE 6.4 MCG: 1 INJECTION, SOLUTION, CONCENTRATE INTRAVENOUS at 13:35

## 2025-06-01 RX ADMIN — CEFAZOLIN SODIUM 2 G: 2 SOLUTION INTRAVENOUS at 12:57

## 2025-06-01 RX ADMIN — SUGAMMADEX 200 MG: 100 INJECTION, SOLUTION INTRAVENOUS at 13:52

## 2025-06-01 RX ADMIN — LIDOCAINE HYDROCHLORIDE 50 MG: 20 INJECTION, SOLUTION INFILTRATION; PERINEURAL at 13:01

## 2025-06-01 RX ADMIN — SODIUM CHLORIDE, SODIUM LACTATE, POTASSIUM CHLORIDE, AND CALCIUM CHLORIDE: .6; .31; .03; .02 INJECTION, SOLUTION INTRAVENOUS at 12:37

## 2025-06-01 RX ADMIN — PROPOFOL 140 MG: 10 INJECTION, EMULSION INTRAVENOUS at 13:01

## 2025-06-01 RX ADMIN — METHADONE HYDROCHLORIDE 10 MG: 10 INJECTION, SOLUTION INTRAMUSCULAR; INTRAVENOUS; SUBCUTANEOUS at 13:01

## 2025-06-01 ASSESSMENT — ACTIVITIES OF DAILY LIVING (ADL)
ADLS_ACUITY_SCORE: 25
ADLS_ACUITY_SCORE: 23
ADLS_ACUITY_SCORE: 25
ADLS_ACUITY_SCORE: 23
ADLS_ACUITY_SCORE: 46
ADLS_ACUITY_SCORE: 25

## 2025-06-01 NOTE — ANESTHESIA CARE TRANSFER NOTE
Patient: Robert Wallace    Procedure: Procedure(s):  CHOLECYSTECTOMY, LAPAROSCOPIC, cholangiograms       Diagnosis: Symptomatic cholelithiasis [K80.20]  Diagnosis Additional Information: No value filed.    Anesthesia Type:   General     Note:    Oropharynx: oropharynx clear of all foreign objects  Level of Consciousness: awake  Oxygen Supplementation: face mask  Level of Supplemental Oxygen (L/min / FiO2): 6  Independent Airway: airway patency satisfactory and stable  Dentition: dentition unchanged  Vital Signs Stable: post-procedure vital signs reviewed and stable  Report to RN Given: handoff report given  Patient transferred to: PACU    Handoff Report: Identifed the Patient, Identified the Reponsible Provider, Reviewed the pertinent medical history, Discussed the surgical course, Reviewed Intra-OP anesthesia mangement and issues during anesthesia, Set expectations for post-procedure period and Allowed opportunity for questions and acknowledgement of understanding      Vitals:  Vitals Value Taken Time   /72    Temp 37    Pulse 99    Resp 14    SpO2 98        Electronically Signed By: REENA Heath CRNA  June 1, 2025  2:03 PM

## 2025-06-01 NOTE — ED PROVIDER NOTES
"  Emergency Department Note      History of Present Illness     Chief Complaint   Abdominal Pain    HPI   Robert Wallace is a 39 year old female with a history of hypothyroidism and diabetes presenting for evaluation of abdominal pain. The patient endorses right upper quadrant abdominal pain that started 3 hours ago. She is currently still having this abdominal pain. Her pain radiates some to her back. She endorses chills, nausea, and vomiting. The patient denies fever, urinary symptoms, pain after eating dinner, pain with deep breathing, and history of having episodes like this in the past.    Independent Historian   None    Past Medical History     Medical History and Problem List   Past Medical History:   Diagnosis Date    Anemia     Diabetes mellitus (H)     Hepatic adenoma     Hypothyroidism     Migraine headache        Medications   No current outpatient medications on file.      Surgical History   Past Surgical History:   Procedure Laterality Date     SECTION N/A 2016    Procedure:  SECTION;  Surgeon: Jennifer Heller MD;  Location:  OR       Physical Exam     Patient Vitals for the past 24 hrs:   BP Temp Temp src Pulse Resp SpO2 Height Weight   25 0135 (!) 153/108 97.8  F (36.6  C) Oral 72 18 97 % 1.549 m (5' 1\") 59 kg (130 lb 1.6 oz)   25 2245 (!) 164/102 -- -- 84 -- -- -- --   25 2230 (!) 161/100 -- -- 82 -- -- -- --   25 221 (!) 160/101 -- -- 83 -- -- -- --   25 2200 (!) 157/96 -- -- 83 -- -- -- --   25 214 (!) 166/99 -- -- 84 -- -- -- --   25 (!) 158/98 -- -- 86 -- -- -- --   25 (!) 156/98 -- -- 87 -- -- -- --   25 -- -- -- -- -- -- -- 58.9 kg (129 lb 12.8 oz)   25 (!) 166/104 97.8  F (36.6  C) Oral 87 18 100 % 1.549 m (5' 1\") --     Physical Exam  General: Patient is awake, alert and interactive when I enter the room. Appears uncomfortable.   Head: The scalp, face, and head appear normal  Eyes: " Conjunctivae and sclerae are normal  Neck: Normal range of motion.   CV: Regular rate.   Resp:  No respiratory distress.   GI: RUQ tenderness but abdomen is soft, no rigidity. No evidence of pulsatile mass. No fluid waves or evidence of ascites. No distension. No hernias or bruising are noted in detailed exam. No CVA tenderness.    MS: Normal tone.   Skin: Normal capillary refill noted  Neuro: Speech is normal and fluent. Face is symmetric. Moving all extremities.   Psych:  Normal affect.  Appropriate interactions.    Diagnostics     Lab Results   Labs Ordered and Resulted from Time of ED Arrival to Time of ED Departure   COMPREHENSIVE METABOLIC PANEL - Abnormal       Result Value    Sodium 136      Potassium 4.3      Carbon Dioxide (CO2) 21 (*)     Anion Gap 15      Urea Nitrogen 14.1      Creatinine 0.75      GFR Estimate >90      Calcium 9.2      Chloride 100      Glucose 315 (*)     Alkaline Phosphatase 92      AST 70 (*)     ALT 71 (*)     Protein Total 7.0      Albumin 3.6      Bilirubin Total <0.2     LIPASE - Abnormal    Lipase 70 (*)    CBC WITH PLATELETS AND DIFFERENTIAL - Abnormal    WBC Count 12.8 (*)     RBC Count 4.06      Hemoglobin 10.7 (*)     Hematocrit 32.5 (*)     MCV 80      MCH 26.4 (*)     MCHC 32.9      RDW 14.0      Platelet Count 511 (*)     % Neutrophils 63      % Lymphocytes 24      % Monocytes 7      % Eosinophils 4      % Basophils 1      % Immature Granulocytes 1      NRBCs per 100 WBC 0      Absolute Neutrophils 8.1      Absolute Lymphocytes 3.1      Absolute Monocytes 0.9      Absolute Eosinophils 0.5      Absolute Basophils 0.1      Absolute Immature Granulocytes 0.1      Absolute NRBCs 0.0     BLOOD GAS VENOUS - Abnormal    pH Venous 7.32      pCO2 Venous 43      pO2 Venous 14 (*)     Bicarbonate Venous 22      Base Excess/Deficit Venous -3.9 (*)     FIO2 21      Oxyhemoglobin Venous 12 (*)     O2 Sat, Venous 12.5 (*)    ROUTINE UA WITH MICROSCOPIC REFLEX TO CULTURE - Abnormal     Color Urine Straw      Appearance Urine Clear      Glucose Urine >=1000 (*)     Bilirubin Urine Negative      Ketones Urine Negative      Specific Gravity Urine 1.025      Blood Urine Negative      pH Urine 7.0      Protein Albumin Urine 10 (*)     Urobilinogen Urine Normal      Nitrite Urine Negative      Leukocyte Esterase Urine Negative      Mucus Urine Present (*)     RBC Urine <1      WBC Urine 0      Squamous Epithelials Urine <1     GLUCOSE BY METER - Abnormal    GLUCOSE BY METER POCT 230 (*)    HCG QUALITATIVE PREGNANCY - Normal    hCG Serum Qualitative Negative     KETONE BETA-HYDROXYBUTYRATE QUANTITATIVE, RAPID - Normal    Ketone (Beta-Hydroxybutyrate) Quantitative 0.30         Imaging   US Abdomen Limited (RUQ)   Final Result   IMPRESSION:   1.  Gallstones. No convincing secondary signs of cholecystitis.      2.  Indeterminant, heterogeneous liver lesions for which contrast-enhanced liver MRI is recommended when clinically feasible in further evaluation.                ED Course      Medications Administered   Medications   ibuprofen (ADVIL/MOTRIN) tablet 400-800 mg (has no administration in time range)   levothyroxine (SYNTHROID/LEVOTHROID) tablet 125 mcg (has no administration in time range)   lisinopril (ZESTRIL) tablet 5 mg (has no administration in time range)   acetaminophen (TYLENOL) tablet 650 mg (has no administration in time range)     Or   acetaminophen (TYLENOL) Suppository 650 mg (has no administration in time range)   polyethylene glycol (MIRALAX) Packet 17 g (has no administration in time range)   senna-docusate (SENOKOT-S/PERICOLACE) 8.6-50 MG per tablet 1 tablet (has no administration in time range)     Or   senna-docusate (SENOKOT-S/PERICOLACE) 8.6-50 MG per tablet 2 tablet (has no administration in time range)   ondansetron (ZOFRAN ODT) ODT tab 4 mg (has no administration in time range)     Or   ondansetron (ZOFRAN) injection 4 mg (has no administration in time range)   prochlorperazine  (COMPAZINE) injection 10 mg (has no administration in time range)     Or   prochlorperazine (COMPAZINE) tablet 10 mg (has no administration in time range)   oxyCODONE IR (ROXICODONE) half-tab 2.5 mg (has no administration in time range)   oxyCODONE (ROXICODONE) tablet 5 mg (has no administration in time range)   morphine (PF) injection 1 mg (has no administration in time range)   morphine (PF) injection 2 mg (has no administration in time range)   naloxone (NARCAN) injection 0.2 mg (has no administration in time range)     Or   naloxone (NARCAN) injection 0.4 mg (has no administration in time range)     Or   naloxone (NARCAN) injection 0.2 mg (has no administration in time range)     Or   naloxone (NARCAN) injection 0.4 mg (has no administration in time range)   sodium chloride 0.9% BOLUS 1,000 mL (0 mLs Intravenous Stopped 5/31/25 2305)   morphine (PF) injection 4 mg (4 mg Intravenous $Given 5/31/25 2154)   ondansetron (ZOFRAN) injection 4 mg (4 mg Intravenous $Given 5/31/25 2154)   ketorolac (TORADOL) injection 15 mg (15 mg Intravenous $Given 5/31/25 2302)       Procedures   Procedures     Discussion of Management   Admitting Hospitalist, Dr. Delgado    ED Course   ED Course as of 06/01/25 0229   Sat May 31, 2025   2302 I obtained history and performed physical exam.   Sun Jun 01, 2025   0019 I rechecked on the patient   0036 I spoke with Dr. Delgado regarding admission. They accepted.       Additional Documentation  None    Medical Decision Making / Diagnosis     STEPHAN Wallace is a 39 year old female who presents emergency department with significant right upper quadrant pain that began 3 hours ago.  She does associated symptoms of chills, nausea, and vomiting.  Upon initial physical exam she does have some right upper quadrant tenderness and does appear quite uncomfortable.  Right upper quadrant ultrasound was obtained which shows evidence of gallstones but no evidence of cholecystitis.  She has no significant  lower abdominal tenderness, guarding or rebound.  She is not pregnant.  UA negative for infection.  Blood work shows variably mildly elevated AST's and ALTs as well as lipase.  However my suspicion for significant biliary obstruction is quite low.  Common bile duct is not significantly dilated.  Patient's pain was difficult to control.  Discussed admission for symptomatic cholelithiasis.  Patient is amenable to this plan.  Will be evaluated by general surgery for possible cholecystectomy in the morning.    Disposition   The patient was admitted to the hospital.     Diagnosis     ICD-10-CM    1. Symptomatic cholelithiasis  K80.20       2. Type 2 diabetes mellitus with hyperglycemia, unspecified whether long term insulin use (H)  E11.65          Scribe Disclosure:  I, Payton Serna, am serving as a scribe at 10:01 PM on 5/31/2025 to document services personally performed by River Lazo MD, based on my observations and the provider's statements to me.        River Lazo MD  06/01/25 023

## 2025-06-01 NOTE — PHARMACY-ADMISSION MEDICATION HISTORY
Pharmacy Intern Admission Medication History    Admission medication history is complete. The information provided in this note is only as accurate as the sources available at the time of the update.    Information Source(s): Patient and CareEverywhere/SureScripts via in-person    Pertinent Information:    Changes made to PTA medication list:  Added: Wellbutrin  Deleted: None  Changed: None    Allergies reviewed with patient and updates made in EHR: yes    Medication History Completed By: Davidson Kaufman 6/1/2025 8:55 AM    PTA Med List   Medication Sig Last Dose/Taking    buPROPion (WELLBUTRIN XL) 150 MG 24 hr tablet Take 150 mg by mouth daily. Past Week    drospirenone-ethinyl estradiol (DARIUSZ) 3-0.02 MG tablet Take 1 tablet by mouth daily. 5/30/2025    levothyroxine (SYNTHROID/LEVOTHROID) 125 MCG tablet Take 1 tablet (125 mcg) by mouth daily Past Week    lisinopril (PRINIVIL/ZESTRIL) 5 MG tablet Take 5 mg by mouth daily Past Week    metFORMIN (GLUCOPHAGE) 1000 MG tablet Take 1,000 mg by mouth 2 times daily (with meals) Past Week

## 2025-06-01 NOTE — ED TRIAGE NOTES
Brought in by ambulance with right sided abdominal pain 9/10. Pt vomited x 4, last BM 5/30, small. Received 4mg Zofran from EMS and fluids running.  with EMS. Hypertensive but thinks she vomited her regular BP meds up today. Afebrile.     Triage Assessment (Adult)       Row Name 05/31/25 9149          Triage Assessment    Airway WDL WDL        Respiratory WDL    Respiratory WDL WDL        Skin Circulation/Temperature WDL    Skin Circulation/Temperature WDL WDL        Cardiac WDL    Cardiac WDL WDL        Peripheral/Neurovascular WDL    Peripheral Neurovascular WDL WDL        Cognitive/Neuro/Behavioral WDL    Cognitive/Neuro/Behavioral WDL WDL

## 2025-06-01 NOTE — ED NOTES
"[facility]  ED Nurse Handoff Report    ED Chief complaint: Abdominal Pain  . ED Diagnosis:   Final diagnoses:   None       Allergies: No Known Allergies    Code Status: Full Code    Activity level - Baseline/Home:  independent.  Activity Level - Current:   independent.   Lift room needed: No.   Bariatric: No   Needed: No   Isolation: No.   Infection: Not Applicable.     Respiratory status: Room air    Vital Signs (within 30 minutes):   Vitals:    05/31/25 2109 05/31/25 2113   BP: (!) 166/104    Pulse: 87    Resp: 18    Temp: 97.8  F (36.6  C)    TempSrc: Oral    SpO2: 100%    Weight:  58.9 kg (129 lb 12.8 oz)   Height: 1.549 m (5' 1\")        Cardiac Rhythm:  ,      Pain level:    Patient confused: No.   Patient Falls Risk: nonskid shoes/slippers when out of bed and patient and family education.   Elimination Status: Has voided     Patient Report - Initial Complaint: Brought in by ambulance with right sided abdominal pain 9/10. Pt vomited x 4, last BM 5/30, small. Received 4mg Zofran from EMS and fluids running.  with EMS. Hypertensive but thinks she vomited her regular BP meds up today. Afebrile     Focused Assessment: A&Ox4. HTN other VVS on RA. RUQ ABD pain, morphine and Toradol given. Pain manageable. Lesions found on liver.      Abnormal Results:   Labs Ordered and Resulted from Time of ED Arrival to Time of ED Departure   COMPREHENSIVE METABOLIC PANEL - Abnormal       Result Value    Sodium 136      Potassium 4.3      Carbon Dioxide (CO2) 21 (*)     Anion Gap 15      Urea Nitrogen 14.1      Creatinine 0.75      GFR Estimate >90      Calcium 9.2      Chloride 100      Glucose 315 (*)     Alkaline Phosphatase 92      AST 70 (*)     ALT 71 (*)     Protein Total 7.0      Albumin 3.6      Bilirubin Total <0.2     LIPASE - Abnormal    Lipase 70 (*)    CBC WITH PLATELETS AND DIFFERENTIAL - Abnormal    WBC Count 12.8 (*)     RBC Count 4.06      Hemoglobin 10.7 (*)     Hematocrit " 32.5 (*)     MCV 80      MCH 26.4 (*)     MCHC 32.9      RDW 14.0      Platelet Count 511 (*)     % Neutrophils 63      % Lymphocytes 24      % Monocytes 7      % Eosinophils 4      % Basophils 1      % Immature Granulocytes 1      NRBCs per 100 WBC 0      Absolute Neutrophils 8.1      Absolute Lymphocytes 3.1      Absolute Monocytes 0.9      Absolute Eosinophils 0.5      Absolute Basophils 0.1      Absolute Immature Granulocytes 0.1      Absolute NRBCs 0.0     BLOOD GAS VENOUS - Abnormal    pH Venous 7.32      pCO2 Venous 43      pO2 Venous 14 (*)     Bicarbonate Venous 22      Base Excess/Deficit Venous -3.9 (*)     FIO2 21      Oxyhemoglobin Venous 12 (*)     O2 Sat, Venous 12.5 (*)    ROUTINE UA WITH MICROSCOPIC REFLEX TO CULTURE - Abnormal    Color Urine Straw      Appearance Urine Clear      Glucose Urine >=1000 (*)     Bilirubin Urine Negative      Ketones Urine Negative      Specific Gravity Urine 1.025      Blood Urine Negative      pH Urine 7.0      Protein Albumin Urine 10 (*)     Urobilinogen Urine Normal      Nitrite Urine Negative      Leukocyte Esterase Urine Negative      Mucus Urine Present (*)     RBC Urine <1      WBC Urine 0      Squamous Epithelials Urine <1     HCG QUALITATIVE PREGNANCY - Normal    hCG Serum Qualitative Negative     KETONE BETA-HYDROXYBUTYRATE QUANTITATIVE, RAPID - Normal    Ketone (Beta-Hydroxybutyrate) Quantitative 0.30          US Abdomen Limited (RUQ)   Final Result   IMPRESSION:   1.  Gallstones. No convincing secondary signs of cholecystitis.      2.  Indeterminant, heterogeneous liver lesions for which contrast-enhanced liver MRI is recommended when clinically feasible in further evaluation.                Treatments provided: see imaging, labs, MAR  Family Comments: n/a  OBS brochure/video discussed/provided to patient:  No  ED Medications:   Medications   morphine (PF) injection 4 mg (has no administration in time range)   sodium chloride 0.9% BOLUS 1,000 mL (0 mLs  Intravenous Stopped 5/31/25 2305)   morphine (PF) injection 4 mg (4 mg Intravenous $Given 5/31/25 2154)   ondansetron (ZOFRAN) injection 4 mg (4 mg Intravenous $Given 5/31/25 2154)   ketorolac (TORADOL) injection 15 mg (15 mg Intravenous $Given 5/31/25 2302)       Drips infusing:  No  For the majority of the shift this patient was Green.   Interventions performed were n/a.    Sepsis treatment initiated: No    Cares/treatment/interventions/medications to be completed following ED care: see in-pt orders    ED Nurse Name: Mala Barger RN  12:29 AM

## 2025-06-01 NOTE — DISCHARGE INSTRUCTIONS
HOME CARE FOLLOWING LAPAROSCOPIC CHOLECYSTECTOMY  SINAN Ruvalcaba, RADHA Geiger C. Pratt, J. Shaheen    INCISIONAL CARE:  Replace the bandage over your incisions DAILY until all drainage stops, or if more comfortable to have in place.  If present, leave the steri-strips (white paper tapes) in place for 14 days after surgery.  If Dermabond (a type of skin glue) is present, leave in place until it wears/flakes off (2-3 weeks).     BATHING:  OK to shower 48 hours after surgery.  Avoid baths for 1 week after surgery.  You may wash your hair at any time.  Gently pat your incision dry after bathing.  Do not apply lotions, creams, or ointments to incisions.    ACTIVITY:  Light Activity -- you may immediately be up and about as tolerated.  Walking is encouraged, increase as tolerated.  Driving/Light Work-- when comfortable and off narcotic pain medications.  Strenuous Work/Activity -- limit lifting to 20 pounds for 2 weeks.  Progressively increase with time.  Active Sports (running, biking, etc.) -- cautiously resume after 2 weeks.    DISCOMFORT:  Local anesthetic placed at surgery should provide relief for 4-8 hours.  Begin taking pain pills before discomfort is severe.  Take the pain medication with some food, when possible, to minimize side effects.  Intermittent use of ice packs may help during the first 1-3 weeks after surgery.  Expect gradual improvement.    Over-the-counter anti-inflammatory medications (i.e. Ibuprofen/Advil/Motrin or Naprosyn/Aleve) may be used per package instructions in addition to or while tapering off the narcotic pain medications to decrease swelling and sensitivity.  DO NOT TAKE these Anti-inflammatory medications if your primary physician has advised against doing so, or if you have acid reflux, ulcer, or bleeding disorder, or take blood-thinner medications.  Call your primary physician or the surgery office if you have medication questions.    After laparoscopic  cholecystectomy, you may have shoulder or upper back discomfort due to the gas used during surgery.  This is temporary and should resolve within 2-3 days.  Frequent short walks may help with this.  You may have decreased energy level for 1-2 weeks after surgery related to your recovery.    DIET:  Start with liquids and gradually increase diet as tolerated.  Drink plenty of fluids.  While taking pain medications, consider use of a stool softener, increase your fiber in your diet, or add a fiber supplement (like Metamucil, Citrucel) to help prevent constipation - a possible side effect of pain medications.  It is not uncommon to experience some bowel changes (loose stools or constipation) after surgery.  Your body has to adapt to you no longer having a gall bladder.  To help minimize this side effect, avoid fatty foods for 1-2 weeks after surgery.  You may then slowly increase the amount of fatty foods in your diet.      NAUSEA:  If nauseated from the anesthetic/pain meds; rest in bed, get up cautiously with assistance, and drink clear liquids (juice, tea, broth).    FOLLOW-UP AFTER SURGERY:  -Our office will contact you approximately 2-3 weeks after surgery to check on your progress and answer any questions you may have.  If you are doing well, you will not need to return for an office appointment.  If any concerns are identified over the phone, we will help you make an appointment to see a provider.    -If you have not received a phone call, have any questions or concerns, or would like to be seen, please call us at [phone].  We are located at: [address], [address]; [place] [address]    -CONTACT US IF THE FOLLOWING DEVELOPS:   1. A fever that is above 101     2. Increased redness, warmth, drainage, bleeding, or swelling.   3. Pain that is not relieved by rest/ice and your prescription.   4.  Increasing pain after 48 hours.   5. Drainage that is thick, cloudy, yellow, green or white.   6. Any other  questions or concerns.      FREQUENTLY ASKED QUESTIONS:    Q:  How should my incision look?    A:  Normally your incision will appear slightly swollen with light redness directly along the incision itself as it heals.  It may feel like a bump or ridge as the healing/scarring happens, and over time (3-4 months) this bump or ridge feeling should slowly go away.  In general, clear or pink watery drainage can be normal at first as your incision heals, but should decrease over time.    Q:  How do I know if my incision is infected?  A:  Look at your incision for signs of infection, like redness around the incision spreading to surrounding skin, or drainage of cloudy or foul-smelling drainage.  If you feel warm, check your temperature to see if you are running a fever.    **If any of these things occur, please notify the nurse at our office.  We may need you to come into the office for an incision check.      Q:  How do I take care of my incision?  A:  If you have a dressing in place - Starting the day after surgery, replace the dressing 1-2 times a day until there is no further drainage from the incision.  At that time, a dressing is no longer needed.  Try to minimize tape on the skin if irritation is occurring at the tape sites.  If you have significant irritation from tape on the skin, please call the office to discuss other method of dressing your incision.    Small pieces of tape called  steri-strips  may be present directly overlying your incision; these may be removed 10 days after surgery unless otherwise specified by your surgeon.  If these tapes start to loosen at the ends, you may trim them back until they fall off or are removed.    A:  If you had  Dermabond  tissue glue used as a dressing (this causes your incision to look shiny with a clear covering over it) - This type of dressing wears off with time and does not require more dressings over the top unless it is draining around the glue as it wears off.  Do  not apply ointments or lotions over the incisions until the glue has completely worn off.    Q:  There is a piece of tape or a sticky  lead  still on my skin.  Can I remove this?  A:  Sometimes the sticky  leads  used for monitoring during surgery or for evaluation in the emergency department are not all removed while you are in the hospital.  These sometimes have a tab or metal dot on them.  You can easily remove these on your own, like taking off a band-aid.  If there is a gel substance under the  lead , simply wipe/clean it off with a washcloth or paper towel.      Q:  What can I do to minimize constipation (very hard stools, or lack of stools)?  A:  Stay well hydrated.  Increase your dietary fiber intake or take a fiber supplement -with plenty of water.  Walk around frequently.  You may consider an over-the-counter stool-softener.  Your Pharmacist can assist you with choosing one that is stocked at your pharmacy.  Constipation is also one of the most common side effects of pain medication.  If you are using pain medication, be pro-active and try to PREVENT problems with constipation by taking the steps above BEFORE constipation becomes a problem.    Q:  What do I do if I need more pain medications?  A:  Call the office to receive refills.  Be aware that certain pain meds cannot be called into a pharmacy and actually require a paper prescription.  A change may be made in your pain med as you progress thru your recovery period or if you have side effects to certain meds.    --Pain meds are NOT refilled after 5pm on weekdays, and NOT AT ALL on the weekends, so please look ahead to prevent problems.      Q:  Why am I having a hard time sleeping now that I am at home?  A:  Many medications you receive while you are in the hospital can impact your sleep for a number of days after your surgery/hospitalization.  Decreased level of activity and naps during the day may also make sleeping at night difficult.  Try to  minimize day-time naps, and get up frequently during the day to walk around your home during your recovery time.  Sleep aides may be of some help, but are not recommended for long-term use.      Q:  I am having some back discomfort.  What should I do?  A:  This may be related to certain positioning that was required for your surgery, extended periods of time in bed, or other changes in your overall activity level.  You may try ice, heat, acetaminophen, or ibuprofen to treat this temporarily.  Note that many pain medications have acetaminophen in them and would state this on the prescription bottle.  Be sure not to exceed the maximum of 4000mg per day of acetaminophen.     **If the pain you are having does not resolve, is severe, or is a flare of back pain you have had on other occasions prior to surgery, please contact your primary physician for further recommendations or for an appointment to be examined at their office.    Q:  Why am I having headaches?  A:  Headaches can be caused by many things:  caffeine withdrawal, use of pain meds, dehydration, high blood pressure, lack of sleep, over-activity/exhaustion, flare-up of usual migraine headaches.  If you feel this is related to muscle tension (a band-like feeling around the head, or a pressure at the low-back of the head) you may try ice or heat to this area.  You may need to drink more fluids (try electrolyte drink like Gatorade), rest, or take your usual migraine medications.   **If your headaches do not resolve, worsen, are accompanied by other symptoms, or if your blood pressure is high, please call your primary physician for recommendation and/or examination.    Q:  I am unable to urinate.  What do I do?  A:  A small percentage of people can have difficulty urinating initially after surgery.  This includes being able to urinate only a very small amount at a time and feeling discomfort or pressure in the very low abdomen.  This is called  urinary retention ,  and is actually an urgent situation.  Proceed to your nearest Emergency department for evaluation (not an Urgent Care Center).  Sometimes the bladder does not work correctly after certain medications you receive during surgery, or related to certain procedures.  You may need to have a catheter placed until your bladder recovers.  When planning to go to an Emergency department, it may help to call the ER to let them know you are coming in for this problem after a surgery.  This may help you get in quicker to be evaluated.  **If you have symptoms of a urinary tract infection, please contact your primary physician for the proper evaluation and treatment.          If you have other questions, please call the office Monday thru Friday between 8am and 4:30pm to discuss with the nurse or physician assistant.  #[phone]    There is a surgeon ON CALL on weekday evenings and over the weekend in case of urgent need only, and may be contacted at the same number.    If you are having an emergency, call 911 or proceed to your nearest emergency department.    Maximum acetaminophen (Tylenol) dose from all sources should not exceed 4 grams (4000 mg) per day.    You received Toradol, an IV form of Ibuprofen (Motrin) at 1 pm.  Do not take any Ibuprofen products until 7 pm if needed.

## 2025-06-01 NOTE — PLAN OF CARE
"Goal Outcome Evaluation:      Plan of Care Reviewed With: patient    Overall Patient Progress: no changeOverall Patient Progress: no change       Admitted to floor 5/1 0130    VS: VSS on RA. Afebrile  Pain/Comfort: Rating RUQ abdominal pain 6/10. Given PRN 5 mg oxycodone x1, IV dilaudid 0.1 mg x1  Assess: Alert and oriented x4. LS clear and equal bilaterally. Denies SOB. Abdomen soft and tender to palpation in RUQ. Denies N/V/D at this time. BS Q6.   Lines: PIV SL. Per provider no IVF at this time d/t receiving 1 L bolus in ED overnight.   Intake: NPO.   Output: Voiding without difficulty.   Activity: Independent in room. Showered overnight.   Social: Calm and cooperative.  Plan: Monitor VS, manage pain/nausea, monitor BS/surgery consult in AM.     Problem: Adult Inpatient Plan of Care  Goal: Plan of Care Review  Description: The Plan of Care Review/Shift note should be completed every shift.  The Outcome Evaluation is a brief statement about your assessment that the patient is improving, declining, or no change.  This information will be displayed automatically on your shift  note.  Outcome: Progressing  Flowsheets (Taken 6/1/2025 0514)  Plan of Care Reviewed With: patient  Overall Patient Progress: no change  Goal: Patient-Specific Goal (Individualized)  Description: You can add care plan individualizations to a care plan. Examples of Individualization might be:  \"Parent requests to be called daily at 9am for status\", \"I have a hard time hearing out of my right ear\", or \"Do not touch me to wake me up as it startles  me\".  Outcome: Progressing  Goal: Absence of Hospital-Acquired Illness or Injury  Outcome: Progressing  Intervention: Identify and Manage Fall Risk  Recent Flowsheet Documentation  Taken 6/1/2025 0145 by Ros Coleman, RN  Safety Promotion/Fall Prevention:   patient and family education   nonskid shoes/slippers when out of bed   room near nurse's station  Intervention: Prevent Skin " Injury  Recent Flowsheet Documentation  Taken 6/1/2025 0145 by Ros Coleman, RN  Body Position: position changed independently  Skin Protection: adhesive use limited  Intervention: Prevent Infection  Recent Flowsheet Documentation  Taken 6/1/2025 0145 by Ros Coleman, RN  Infection Prevention:   rest/sleep promoted   hand hygiene promoted  Goal: Optimal Comfort and Wellbeing  Outcome: Progressing  Intervention: Monitor Pain and Promote Comfort  Recent Flowsheet Documentation  Taken 6/1/2025 0250 by Ros Coleman, RN  Pain Management Interventions: medication (see MAR)  Goal: Readiness for Transition of Care  Outcome: Progressing  Intervention: Mutually Develop Transition Plan  Recent Flowsheet Documentation  Taken 6/1/2025 0137 by Ros Coleman, RN  Equipment Currently Used at Home: none

## 2025-06-01 NOTE — ANESTHESIA PREPROCEDURE EVALUATION
Anesthesia Pre-Procedure Evaluation    Patient: Robert Wallace   MRN: [id number] : 1985          Procedure : Procedure(s):  CHOLECYSTECTOMY, LAPAROSCOPIC         Past Medical History:   Diagnosis Date    Anemia     Diabetes mellitus (H)     Hepatic adenoma     Hypothyroidism     Migraine headache       Past Surgical History:   Procedure Laterality Date     SECTION N/A 2016    Procedure:  SECTION;  Surgeon: Jennifer Heller MD;  Location: [facility] OR      No Known Allergies   Social History     Tobacco Use    Smoking status: Never    Smokeless tobacco: Never   Substance Use Topics    Alcohol use: Yes     Comment: occas.      Wt Readings from Last 1 Encounters:   25 59 kg (130 lb 1.6 oz)        Anesthesia Evaluation            ROS/MED HX  ENT/Pulmonary:  - neg pulmonary ROS     Neurologic:  - neg neurologic ROS     Cardiovascular:     (+) Dyslipidemia hypertension- -   -  - -                                      METS/Exercise Tolerance:     Hematologic:  - neg hematologic  ROS     Musculoskeletal:  - neg musculoskeletal ROS     GI/Hepatic:     (+)          cholecystitis/cholelithiasis,   liver disease,       Renal/Genitourinary:       Endo:     (+)  type II DM,   Not using insulin,     thyroid problem, hypothyroidism,           Psychiatric/Substance Use:  - neg psychiatric ROS     Infectious Disease:       Malignancy:       Other:              Physical Exam  Airway  Mallampati: II  TM distance: >3 FB  Neck ROM: full  Mouth opening: >= 4 cm    Cardiovascular   Rhythm: regular  Rate: normal rate     Dental   (+) Minor Abnormalities - some fillings, tiny chips      Pulmonary - normal exam      Neurological - normal exam  She appears awake, alert and oriented x3.    Other Findings       OUTSIDE LABS:  CBC:   Lab Results   Component Value Date    WBC 12.8 (H) 2025    WBC 12.8 (H) 2025    HGB 10.0 (L) 2025    HGB 10.7 (L) 2025    HCT 31.4 (L) 2025    HCT 32.5 (L)  05/31/2025     (H) 06/01/2025     (H) 05/31/2025     BMP:   Lab Results   Component Value Date     06/01/2025     05/31/2025    POTASSIUM 4.3 06/01/2025    POTASSIUM 4.3 05/31/2025    CHLORIDE 106 06/01/2025    CHLORIDE 100 05/31/2025    CO2 21 (L) 06/01/2025    CO2 21 (L) 05/31/2025    BUN 13.9 06/01/2025    BUN 14.1 05/31/2025    CR 0.74 06/01/2025    CR 0.75 05/31/2025     (H) 06/01/2025     (H) 06/01/2025     COAGS:   Lab Results   Component Value Date    PTT 31 09/26/2011    INR 1.01 09/26/2011     POC:   Lab Results   Component Value Date     (H) 12/08/2019    HCG Negative 02/02/2014    HCGS Negative 05/31/2025     HEPATIC:   Lab Results   Component Value Date    ALBUMIN 3.3 (L) 06/01/2025    PROTTOTAL 6.4 06/01/2025     (H) 06/01/2025     (H) 06/01/2025    ALKPHOS 86 06/01/2025    BILITOTAL <0.2 06/01/2025     OTHER:   Lab Results   Component Value Date    LACT 2.6 (H) 12/05/2019    A1C 9.6 (H) 12/05/2019    DB 8.6 (L) 06/01/2025    PHOS 3.2 12/05/2019    MAG 2.3 12/06/2019    LIPASE 70 (H) 05/31/2025    TSH 0.03 (L) 12/05/2019    T4 1.47 (H) 12/05/2019    CRP 14.5 (H) 12/06/2019       Anesthesia Plan    ASA Status:  2      NPO Status: NPO Appropriate   Anesthesia Type: General.  Airway: oral.  Induction: intravenous.  Maintenance: Balanced.   Techniques and Equipment:       - Monitoring Plan: standard ASA monitoring     Consents    Anesthesia Plan(s) and associated risks, benefits, and realistic alternatives discussed. Questions answered and patient/representative(s) expressed understanding.     - Discussed: anesthesiologist     - Discussed with:  Patient        - Pt is DNR/DNI Status: no DNR     Blood Consent:      - Discussed with: not discussed.     Postoperative Care    Pain management: non-narcotic analgesics, plan for postoperative opioid use, multimodal analgesia.     Comments:    Other Comments:   Methadone 10 mg  Decadron 8 mg  Zofran 4  mg  Toradol 15 mg  Precedex  Sugammadex if paralytic to be used                Byron Elam MD    I have reviewed the pertinent notes and labs in the chart from the past 30 days and (re)examined the patient.  Any updates or changes from those notes are reflected in this note.    Clinically Significant Risk Factors Present on Admission           # Hypocalcemia: Lowest Ca = 8.6 mg/dL in last 2 days, will monitor and replace as appropriate     # Hypoalbuminemia: Lowest albumin = 3.3 g/dL at 6/1/2025  6:48 AM, will monitor as appropriate     # Hypertension: Noted on problem list      # Anemia: based on hgb <11

## 2025-06-01 NOTE — H&P
History and Physical     Robert Wallace MRN# [id number]   YOB: 1985 Age: 39 year old      Date of Admission:  5/31/2025    Primary care provider: Tiffany Chen          Assessment and Plan:     Summary of Stay: Robert Wallace is a 39 year old female with a history of  T2dm/htn/hlp, hypothyroidism admitted on 5/31/2025 with symptomatic cholelithiasis     Around 8 pm last night had abrupt onset of RUQ abdominal pain with n/v.  Had some associated chills but denies any fevers.  Did not have dinner last night but had a late lunch at a buffet    ER VS with hypertension 150-160's/'s, she is afebrile  CMP with bicarb 21 nl AG.  AST/ALT 70/71, normal bili, BG is high at 315, linda 230 after IVF  Ketones 0.3  Lipase 70  Beta hcg negative   CBC with mild leukocytosis 12.8 with nl diff, hgb is low at 10.2    UA with lots of glucose but no inflammatory findings     US abd:  Gallstones. No convincing secondary signs of cholecystitis. Indeterminant, heterogeneous liver lesions for which contrast-enhanced liver MRI is recommended when clinically feasible in further evaluation.    She's received some IV morphine with improvement in sx    I am asked to admit for symptomatic cholelithiasis       Problem List:   Cholelithiasis  Admit for sx control and formal surgical evaluation.  Has mildly elevated LFTs-they've been elevated in the past in a similar fashion but I don't see any LFTs on her for the past several years so not sure if this represents anything new.  Doubt this is related to the cholelithiasis though   Keep NPO in case needs mireya later today     Elevated lipase  She has no epigastric pain on examination so likely just reflects her underlying GI process     Htn, hlp  Per patient pta is only lisinopril 5 mg every day.  No longer on statin   -resumed      T2dm   Is on metformin daily, unsure of dose.  Care everywhere shows 1 gm bid but she states she takes 2 tabs once a day but can't recall if they are  500 or 1000 mg tabs  -current , requested QID BG cks and to notify MD if > 250     Abnormal liver on US  Has hx of hepatic adenoma.  Not sure if that's what they are seeing on the US.  Rads recs dedicated MRI which can be done in OP setting  -I did not discuss this finding with her, should follow-up with PCP to see if this is needed      DVT Prophylaxis: Low Risk/Ambulatory with no VTE prophylaxis indicated  Code Status: Full Code  Functional Status: independent  Velasquez: not needed  Access:   PIV              Time spent 45 minutes reviewing epic including notes/labs/prior hx, current medications.  In addition to interviewing and examining the patient, updated patient and family regarding plan of care          Chief Complaint:     N/v/ abdominal pain        History of Present Illness:   Robert Wallace is a 39 year old female with a history of  T2dm/htn/hlp, hypothyroidism admitted on 5/31/2025 with symptomatic cholelithiasis     Around 8 pm last night had abrupt onset of RUQ abdominal pain with n/v.  Had some associated chills but denies any fevers.  Did not have dinner last night but had a late lunch at a buffet    ER VS with hypertension 150-160's/'s, she is afebrile  CMP with bicarb 21 nl AG.  AST/ALT 70/71, normal bili, BG is high at 315, linda 230 after IVF  Ketones 0.3  Lipase 70  Beta hcg negative   CBC with mild leukocytosis 12.8 with nl diff, hgb is low at 10.2    UA with lots of glucose but no inflammatory findings     US abd:  Gallstones. No convincing secondary signs of cholecystitis. Indeterminant, heterogeneous liver lesions for which contrast-enhanced liver MRI is recommended when clinically feasible in further evaluation.    She's received some IV morphine with improvement in sx    I am asked to admit for symptomatic cholelithiasis       The history is obtained in discussion with the ER provider Glen Lazo MD and the patient with good reliability     Epic and Care everywhere were  "extensively reviewed        Past Medical History:     Past Medical History:   Diagnosis Date    Anemia     Diabetes mellitus (H)     Hepatic adenoma     Hypothyroidism     Migraine headache              Past Surgical History:     Past Surgical History:   Procedure Laterality Date     SECTION N/A 2016    Procedure:  SECTION;  Surgeon: Jennifer Heller MD;  Location:  OR             Social History:     Social History     Tobacco Use    Smoking status: Never    Smokeless tobacco: Never   Substance Use Topics    Alcohol use: Yes     Comment: occas.             Family History:   I have reviewed this patient's family history         Allergies:   No Known Allergies          Medications:     Await formal med rec           Review of Systems:     A Comprehensive greater than 10 system review of systems was carried out.  Pertinent positives and negatives are noted above.  Otherwise negative for contributory information.           Physical Exam:   Blood pressure (!) 153/108, pulse 72, temperature 97.8  F (36.6  C), temperature source Oral, resp. rate 18, height 1.549 m (5' 1\"), weight 59 kg (130 lb 1.6 oz), SpO2 97%, unknown if currently breastfeeding.  Exam:    General:  Pleasant nad looks stated age  HEENT:  Head nc/at sclera clear PER  Neck is supple  Lungs: cta b nl effort   CV:  RRR no m/r/g no le edema  Abd:  S/nd, positive for RUQ tenderness   Neuro:  Cn 2-12 grossly intact and carter  Alert and oriented affect appropriate   Skin:  W/d no c/c               Data:     Results for orders placed or performed during the hospital encounter of 25   US Abdomen Limited (RUQ)     Status: None    Narrative    EXAM: US ABDOMEN LIMITED  LOCATION: [facility]  DATE: 2025    INDICATION: RUQ abdominal pain  COMPARISON: Noncontrast CT from 2019.  TECHNIQUE: Limited abdominal ultrasound.    FINDINGS:    GALLBLADDER: Bones. Normal wall thickness. No pericholecystic fluid. Negative " sonographic Guerrero's sign.    BILE DUCTS: No biliary dilatation. The common duct measures 3 mm.    LIVER: There are 2 heterogeneous masses in the liver, one of which measures up to 6.2 cm and another of which measures up to 3.8 cm. The portal vein is patent with flow in the normal direction.    RIGHT KIDNEY: No hydronephrosis.    PANCREAS: The visualized portions are normal.    No ascites.      Impression    IMPRESSION:  1.  Gallstones. No convincing secondary signs of cholecystitis.    2.  Indeterminant, heterogeneous liver lesions for which contrast-enhanced liver MRI is recommended when clinically feasible in further evaluation.       Comprehensive metabolic panel     Status: Abnormal   Result Value Ref Range    Sodium 136 135 - 145 mmol/L    Potassium 4.3 3.4 - 5.3 mmol/L    Carbon Dioxide (CO2) 21 (L) 22 - 29 mmol/L    Anion Gap 15 7 - 15 mmol/L    Urea Nitrogen 14.1 6.0 - 20.0 mg/dL    Creatinine 0.75 0.51 - 0.95 mg/dL    GFR Estimate >90 >60 mL/min/1.73m2    Calcium 9.2 8.8 - 10.4 mg/dL    Chloride 100 98 - 107 mmol/L    Glucose 315 (H) 70 - 99 mg/dL    Alkaline Phosphatase 92 40 - 150 U/L    AST 70 (H) 0 - 45 U/L    ALT 71 (H) 0 - 50 U/L    Protein Total 7.0 6.4 - 8.3 g/dL    Albumin 3.6 3.5 - 5.2 g/dL    Bilirubin Total <0.2 <=1.2 mg/dL   Accident Draw     Status: None    Narrative    The following orders were created for panel order Accident Draw.  Procedure                               Abnormality         Status                     ---------                               -----------         ------                     Extra Blue Top Tube[[id number]]                             Final result               Extra Red Top Tube[[id number]]                              Final result                 Please view results for these tests on the individual orders.   Lipase     Status: Abnormal   Result Value Ref Range    Lipase 70 (H) 13 - 60 U/L   CBC with platelets and differential     Status: Abnormal   Result Value  Ref Range    WBC Count 12.8 (H) 4.0 - 11.0 10e3/uL    RBC Count 4.06 3.80 - 5.20 10e6/uL    Hemoglobin 10.7 (L) 11.7 - 15.7 g/dL    Hematocrit 32.5 (L) 35.0 - 47.0 %    MCV 80 78 - 100 fL    MCH 26.4 (L) 26.5 - 33.0 pg    MCHC 32.9 31.5 - 36.5 g/dL    RDW 14.0 10.0 - 15.0 %    Platelet Count 511 (H) 150 - 450 10e3/uL    % Neutrophils 63 %    % Lymphocytes 24 %    % Monocytes 7 %    % Eosinophils 4 %    % Basophils 1 %    % Immature Granulocytes 1 %    NRBCs per 100 WBC 0 <1 /100    Absolute Neutrophils 8.1 1.6 - 8.3 10e3/uL    Absolute Lymphocytes 3.1 0.8 - 5.3 10e3/uL    Absolute Monocytes 0.9 0.0 - 1.3 10e3/uL    Absolute Eosinophils 0.5 0.0 - 0.7 10e3/uL    Absolute Basophils 0.1 0.0 - 0.2 10e3/uL    Absolute Immature Granulocytes 0.1 <=0.4 10e3/uL    Absolute NRBCs 0.0 10e3/uL   Extra Blue Top Tube     Status: None   Result Value Ref Range    Hold Specimen JIC    Extra Red Top Tube     Status: None   Result Value Ref Range    Hold Specimen JIC    HCG QUALitative pregnancy (blood)     Status: Normal   Result Value Ref Range    hCG Serum Qualitative Negative Negative   Ketone Beta-Hydroxybutyrate Quantitative     Status: Normal   Result Value Ref Range    Ketone (Beta-Hydroxybutyrate) Quantitative 0.30 <=0.30 mmol/L   Blood gas venous     Status: Abnormal   Result Value Ref Range    pH Venous 7.32 7.32 - 7.43    pCO2 Venous 43 40 - 50 mm Hg    pO2 Venous 14 (L) 25 - 47 mm Hg    Bicarbonate Venous 22 21 - 28 mmol/L    Base Excess/Deficit Venous -3.9 (L) -3.0 - 3.0 mmol/L    FIO2 21     Oxyhemoglobin Venous 12 (L) 70 - 75 %    O2 Sat, Venous 12.5 (L) 70.0 - 75.0 %    Narrative    In healthy individuals, oxyhemoglobin (O2Hb) and oxygen saturation (SO2) are approximately equal. In the presence of dyshemoglobins, oxyhemoglobin can be considerably lower than oxygen saturation.   UA with Microscopic reflex to Culture     Status: Abnormal    Specimen: Urine, Midstream   Result Value Ref Range    Color Urine Straw  Colorless, Straw, Light Yellow, Yellow    Appearance Urine Clear Clear    Glucose Urine >=1000 (A) Negative mg/dL    Bilirubin Urine Negative Negative    Ketones Urine Negative Negative mg/dL    Specific Gravity Urine 1.025 1.003 - 1.035    Blood Urine Negative Negative    pH Urine 7.0 5.0 - 7.0    Protein Albumin Urine 10 (A) Negative mg/dL    Urobilinogen Urine Normal Normal mg/dL    Nitrite Urine Negative Negative    Leukocyte Esterase Urine Negative Negative    Mucus Urine Present (A) None Seen /LPF    RBC Urine <1 <=2 /HPF    WBC Urine 0 <=5 /HPF    Squamous Epithelials Urine <1 <=1 /HPF    Narrative    Urine Culture not indicated   Glucose by meter     Status: Abnormal   Result Value Ref Range    GLUCOSE BY METER POCT 230 (H) 70 - 99 mg/dL   CBC with Platelets & Differential     Status: Abnormal    Narrative    The following orders were created for panel order CBC with Platelets & Differential.  Procedure                               Abnormality         Status                     ---------                               -----------         ------                     CBC with platelets and ...[[id number]]  Abnormal            Final result                 Please view results for these tests on the individual orders.

## 2025-06-01 NOTE — ANESTHESIA POSTPROCEDURE EVALUATION
Patient: Robert Wallace    Procedure: Procedure(s):  CHOLECYSTECTOMY, LAPAROSCOPIC, cholangiograms       Anesthesia Type:  General    Note:  Disposition: Inpatient   Postop Pain Control: Uneventful            Sign Out: Well controlled pain   PONV: No   Neuro/Psych: Uneventful            Sign Out: Acceptable/Baseline neuro status   Airway/Respiratory: Uneventful            Sign Out: Acceptable/Baseline resp. status   CV/Hemodynamics: Uneventful            Sign Out: Acceptable CV status   Other NRE: NONE   DID A NON-ROUTINE EVENT OCCUR? No           Last vitals:  Vitals Value Taken Time   BP     Temp     Pulse     Resp     SpO2         Electronically Signed By: Byron Elam MD  June 1, 2025  2:05 PM

## 2025-06-01 NOTE — CONSULTS
Chief complaint:  Abdominal pain, right upper quadrant    HPI:  This patient is a 39 year old female who presents with abrupt onset of right upper quadrant abdominal pain associated with nausea and vomiting.  This began last evening.  She had some chills, but no fevers.  She ate a late lunch buffet.  She continues to have mild to moderate discomfort in the right upper quadrant.    Past Medical History:   has a past medical history of Anemia, Diabetes mellitus (H), Hepatic adenoma, Hypothyroidism, and Migraine headache.    Past Surgical History:  Past Surgical History:   Procedure Laterality Date     SECTION N/A 2016    Procedure:  SECTION;  Surgeon: Jennifer Heller MD;  Location:  OR        Social History:  Social History     Socioeconomic History    Marital status:      Spouse name: Not on file    Number of children: Not on file    Years of education: Not on file    Highest education level: Not on file   Occupational History    Not on file   Tobacco Use    Smoking status: Never    Smokeless tobacco: Never   Substance and Sexual Activity    Alcohol use: Yes     Comment: occas.    Drug use: No    Sexual activity: Not on file   Other Topics Concern    Not on file   Social History Narrative    Not on file     Social Drivers of Health     Financial Resource Strain: Low Risk  (2025)    Financial Resource Strain     Within the past 12 months, have you or your family members you live with been unable to get utilities (heat, electricity) when it was really needed?: No   Food Insecurity: Low Risk  (2025)    Food Insecurity     Within the past 12 months, did you worry that your food would run out before you got money to buy more?: No     Within the past 12 months, did the food you bought just not last and you didn t have money to get more?: No   Transportation Needs: Low Risk  (2025)    Transportation Needs     Within the past 12 months, has lack of transportation kept you from medical  appointments, getting your medicines, non-medical meetings or appointments, work, or from getting things that you need?: No   Physical Activity: Not on file   Stress: Not on file   Social Connections: Socially Integrated (7/15/2024)    Received from [facility]    Social Connections     Do you often feel lonely or isolated from those around you?: 0   Interpersonal Safety: Low Risk  (6/1/2025)    Interpersonal Safety     Do you feel physically and emotionally safe where you currently live?: Yes     Within the past 12 months, have you been hit, slapped, kicked or otherwise physically hurt by someone?: No     Within the past 12 months, have you been humiliated or emotionally abused in other ways by your partner or ex-partner?: No   Housing Stability: Low Risk  (6/1/2025)    Housing Stability     Do you have housing? : Yes     Are you worried about losing your housing?: No        Family History:  History reviewed. No pertinent family history.    Review of Systems:  The 10 point Review of Systems is negative other than noted in the HPI and above.    Physical Exam:  General - This is a well developed, well nourished female in no apparent distress.  HEENT - Normocephalic, atraumatic.  No scleral icterus.  Neck - supple without masses  Lungs - clear to ascultation.    Heart - Regular rate & rhythm without murmur  Abdomen:   soft, non-distended with tenderness noted in the right upper quadrant.  Extremities - warm without edema  Neurologic - nonfocal    Relevant labs:  Liver function tests reveal an alkaline phosphatase of 86, ALT of 179, AST of 218, and total bilirubin of 0.2. Admission labs showed an ALT of 71 and AST of 70.    Imaging:  Ultrasound shows gallstones without obvious signs of cholecystitis.  A couple of indeterminate heterogeneous liver lesions are seen for which contrast enhanced liver MRI when clinically feasible was recommended.  The primary team discussed this with the  patient and has recommended follow-up with the patient's primary physician.    Assessment and Plan:  Patient with right upper quadrant pain, ongoing to some degree, and gallstones.  Given the increase in her liver function tests today, I think it is reasonable to perform a cholangiogram with laparoscopic cholecystectomy.  We discussed the procedure, along with its risks and complications, in detail.  The patient has agreed to proceed.        Keaton Savage MD  [facility]

## 2025-06-01 NOTE — DISCHARGE SUMMARY
"Hospitalist Discharge Summary      Robert Wallace MRN# [id number]   YOB: 1985 Age: 39 year old     Date of Admission:  5/31/2025  Date of Discharge:  6/1/2025  Admitting Physician:  Chely Delgado MD  Discharge Physician:  Dae Vásquez DO  Discharging Service:  Hospitalist     Primary Provider: Tiffany Chen          Discharge Diagnosis:     Cholelithiasis  Elevated lipase  DM2  HTN  HLP  Abnormal liver US             Discharge Disposition:     Discharged to home           Allergies:     No Known Allergies           Discharge Medications:     Current Discharge Medication List        CONTINUE these medications which have NOT CHANGED    Details   buPROPion (WELLBUTRIN XL) 150 MG 24 hr tablet Take 150 mg by mouth daily.      drospirenone-ethinyl estradiol (DARIUSZ) 3-0.02 MG tablet Take 1 tablet by mouth daily.      levothyroxine (SYNTHROID/LEVOTHROID) 125 MCG tablet Take 1 tablet (125 mcg) by mouth daily  Qty: 30 tablet, Refills: 1    Associated Diagnoses: Hypothyroidism, unspecified type      lisinopril (PRINIVIL/ZESTRIL) 5 MG tablet Take 5 mg by mouth daily      metFORMIN (GLUCOPHAGE) 1000 MG tablet Take 1,000 mg by mouth 2 times daily (with meals)           STOP taking these medications       ibuprofen (ADVIL,MOTRIN) 400 MG tablet Comments:   Reason for Stopping:                      Condition on Discharge:     Discharge condition: Stable   Discharge vitals: Blood pressure (!) 160/100, pulse 77, temperature 98.1  F (36.7  C), temperature source Oral, resp. rate 20, height 1.549 m (5' 1\"), weight 59 kg (130 lb 1.6 oz), SpO2 99%, unknown if currently breastfeeding.   Code status on discharge: Full Code      BASIC PHYSICAL EXAMINATION:  GENERAL: No apparent distress.  CARDIOVASCULAR: Regular rate and rhythm without murmurs.  PULMONARY: Clear to auscultation bilaterally.   GASTROINTESTINAL: Abdomen soft, non-tender.  EXTREMITIES: No edema, pulses intact.  NEUROLOGIC: No " focal deficits.            History of Illness:   See detailed admission note for full details.               Procedures excluding imaging which is summarized below:     Please see details in the electronic medical record.           Consultations:     SURGERY GENERAL IP CONSULT          Significant Results:     Results for orders placed or performed during the hospital encounter of 05/31/25   US Abdomen Limited (RUQ)    Narrative    EXAM: US ABDOMEN LIMITED  LOCATION: [facility]  DATE: 5/31/2025    INDICATION: RUQ abdominal pain  COMPARISON: Noncontrast CT from 12/5/2019.  TECHNIQUE: Limited abdominal ultrasound.    FINDINGS:    GALLBLADDER: Bones. Normal wall thickness. No pericholecystic fluid. Negative sonographic Guerrero's sign.    BILE DUCTS: No biliary dilatation. The common duct measures 3 mm.    LIVER: There are 2 heterogeneous masses in the liver, one of which measures up to 6.2 cm and another of which measures up to 3.8 cm. The portal vein is patent with flow in the normal direction.    RIGHT KIDNEY: No hydronephrosis.    PANCREAS: The visualized portions are normal.    No ascites.      Impression    IMPRESSION:  1.  Gallstones. No convincing secondary signs of cholecystitis.    2.  Indeterminant, heterogeneous liver lesions for which contrast-enhanced liver MRI is recommended when clinically feasible in further evaluation.           Transthoracic Echocardiogram Results:  No results found for this or any previous visit (from the past 4320 hours).             Pending Results:     Unresulted Labs Ordered in the Past 30 Days of this Admission       No orders found for last 31 day(s).                        Discharge Instructions and Follow-Up:     Discharge instructions and follow-up:   Discharge Procedure Orders   Reason for your hospital stay   Order Comments: Cholelithiasis.     Activity   Order Comments: Your activity upon discharge: activity per surgery service and no driving while  on analgesics     Order Specific Question Answer Comments   Is discharge order? Yes      Follow Up   Order Comments: Follow-up dedicated liver MRI to assess liver lesion seen on US.  Follow-up with surgery service per their recommendations.     Full Code     Order Specific Question Answer Comments   Code status determined by: Discussion with patient/ legal decision maker      Diet   Order Comments: Follow this diet upon discharge: Low fat.     Order Specific Question Answer Comments   Is discharge order? Yes      Hospital Follow-up with Existing Primary Care Provider (PCP)   Standing Status: Future Standing Exp. Date: 07/01/25   Order Comments:       Order Specific Question Answer Comments   Schedule Primary Care visit within 30 Days    Recommended labs and Imaging (to be ordered by Primary Care Provider) Dedicated liver MRI to assess liver lesion seen on US    Additional Appointment Details Dedicated liver MRI to assess liver lesion seen on US              Hospital Course:     Robert Wallace is a 39 year old female with a history of DM2, HLP, HTN, and hypothyroidism admitted on 5/31/2025 with symptomatic cholelithiasis.  Around 8 pm last night had abrupt onset of RUQ abdominal pain with vomiting  Had some associated chills but denies any fevers.  Did not have dinner last night but had a late lunch at a buffet.     In the ER her VS with hypertension 150-160's/'s, she is afebrile.  CMP with bicarbonate of 21 with normal AG.  AST/ALT was 70/71, normal bilirubin.  BG is high at 315, recheck 230 after IVF.  Ketones 0.3, lipase 70, UPT negative, CBC with mild leukocytosis 12.8 with normal differential and hemoglobin low at 10.2.  UA with lots of glucose but no inflammatory findings.     US with gallstones.  No convincing secondary signs of cholecystitis.  Indeterminant, heterogeneous liver lesions for which contrast-enhanced liver MRI is recommended when clinically feasible in further evaluation.  She received some  IV morphine with improvement in symptoms and we were asked to admit for symptomatic cholelithiasis.     General surgery has been consulted and is planning laparoscopic cholecystectomy later today.  She should be able to discharge from the PACU afterward.    Problem List:   Cholelithiasis  Admit for symptom control and formal surgical evaluation.  Has mildly elevated LFTs, they've been elevated in the past in a similar fashion but I don't see any LFTs on her for the past several years so not sure if this represents anything new.  Doubt this is related to the cholelithiasis though, likely some degree of fatty liver disease.  -NPO, IVF  -PRN analgesics and antiemetics  -Surgery consult and tentative plans for laparoscopic cholecystectomy today, likely can discharge from the PACU     Elevated lipase  She has no epigastric pain on examination so likely just reflects her underlying GI process.      HTN  HLP  Per patient PTA is only lisinopril 5 mg every day.  No longer on statin.   -Resumed     DM2  Is on metformin daily, unsure of dose.  Care everywhere shows 1 gm bid but she states she takes 2 tabs once a day but can't recall if they are 500 or 1000 mg tabs.  -Sliding scale insulin while here, resume metformin on discharge     Abnormal liver on US  Has history of hepatic adenoma.  Not sure if that's what they are seeing on the US.  Radiology recommending dedicated MRI which can be done in outpatient setting.  -I did discuss this finding with her, should follow-up with PCP to see if this is needed    The patient was seen, examined, and counseled on this day. The hospitalization and plan of care was reviewed with the patient extensively. All questions were addressed and the patient agreed to follow-up as noted above.      Total time spent in face to face contact with the patient and coordinating discharge was:  35 Minutes    Dae Vásquez DO MPH  [facility] Hospitalist  [address] [place] [address]  06/01/2025

## 2025-06-01 NOTE — ANESTHESIA PROCEDURE NOTES
Airway       Patient location during procedure: OR       Procedure Start/Stop Times: 6/1/2025 1:03 PM  Staff -        CRNA: Christoph Hu APRN CRNA       Performed By: CRNA  Consent for Airway        Urgency: elective  Indications and Patient Condition       Indications for airway management: lobito-procedural       Induction type:intravenous       Mask difficulty assessment: 1 - vent by mask    Final Airway Details       Final airway type: endotracheal airway       Successful airway: ETT - single  Endotracheal Airway Details        ETT size (mm): 7.0       Cuffed: yes       Successful intubation technique: direct laryngoscopy       DL Blade Type: Martinez 2       Grade View of Cords: 1       Adjucts: stylet       Position: Right       Measured from: lips       Secured at (cm): 21    Post intubation assessment        Placement verified by: capnometry, equal breath sounds and chest rise        Number of attempts at approach: 1       Number of other approaches attempted: 0       Secured with: tape       Ease of procedure: easy       Dentition: Unchanged    Medication(s) Administered   Medication Administration Time: 6/1/2025 1:03 PM

## 2025-06-01 NOTE — OP NOTE
General Surgery Operative Note    Pre-operative diagnosis:  Symptomatic cholelithiasis [K80.20]   Post-operative diagnosis: same   Procedure: Laparoscopic Cholecystectomy with cholangiogram   Surgeon: Keaton Savage MD   Assistant(s): Uday Zaragoza PA-C - the physician assistant was medically necessary to assist in prepping, positioning, camera operation, retraction/exposure and closure of the port site.    Anesthesia: General    Estimated blood loss: 5 cc's   Drains placed: None   Complications:  None   Findings:  Gallbladder without obvious inflammation.  Intraoperative cholangiogram revealed no evidence of obstruction or common duct stones.     INDICATIONS FOR OPERATION: This is a patient with upper abdominal pain gallstones.  She also had mild elevation of liver function tests.  Laparoscopic cholecystectomy with cholangiogram was recommended.  The procedure along with its risks and complications was discussed in detail and the patient agreed to proceed.    DETAILS OF THE OPERATION: After informed consent the patient was taken to the operating room where she underwent satisfactory induction of general anesthesia.  The patient was then sterilely prepped and draped.  A supraumbilical skin incision was made using a skin knife.  The dissection was carried bluntly down to the fascia.  The fascia was opened using electrocautery and the Diehl trocar was then inserted.  Pneumoperitoneum was achieved using CO2 insufflation, and under direct visualization  three  5 mm upper abdominal ports were placed.  The gallbladder was visualized and was grasped. It was pulled up over the liver and the cystic duct was exposed.  The cystic duct was then skeletonized, and a single clip was placed on the gallbladder end.  A cholangiogram catheter was inserted into the abdomen through 12-gauge Angiocath, and was then threaded into the cystic duct and clipped in place.  C-arm cholangiogram was performed.  There was no evidence of  common duct stones.  The cholangiogram catheter was now removed and the cystic duct was double clipped on the common duct end and divided.  The cystic artery was now skeletonized, triple clipped and divided.  The posterior branch was also clipped. The gallbladder was then dissected away from the liver using electrocautery.  The gallbladder was then placed in an Endo Catch bag and removed through the supraumbilical incision.  The gallbladder fossa was irrigated out.  There was excellent hemostasis and the clips were in good position.  The trocar sites were now infiltrated with half percent Marcaine with epinephrine.  The trochars were removed under direct visualization.  The supraumbilical fascia was then closed using 0 Vicryl suture.  Skin incisions were closed using 4-0 subcuticular Vicryl followed by Steri-Strips.    The patient was transferred to the recovery room in satisfactory condition.  Sponge and needle counts were correct at the close of the case.     Specimens:   ID Type Source Tests Collected by Time Destination   1 : Gallbladder Tissue Gallbladder SURGICAL PATHOLOGY EXAM Keaton Savage MD 6/1/2025  1:25 PM            Keaton Savage MD

## 2025-06-03 ENCOUNTER — TELEPHONE (OUTPATIENT)
Dept: SURGERY | Facility: CLINIC | Age: 40
End: 2025-06-03
Payer: COMMERCIAL

## 2025-06-03 DIAGNOSIS — G89.18 POSTOPERATIVE PAIN: Primary | ICD-10-CM

## 2025-06-03 NOTE — TELEPHONE ENCOUNTER
Name of caller: Patient    Reason for Call:  Med refill request      Surgeon:  Erick Savage MD    Recent Surgery:  Yes.    If yes, when & what type:  CRISTINA 6/1/25      Best phone number to reach pt at is: [phone]  Ok to leave a message with medical info? Yes.    Pharmacy preferred (if calling for a refill): na

## 2025-06-04 LAB
PATH REPORT.COMMENTS IMP SPEC: NORMAL
PATH REPORT.COMMENTS IMP SPEC: NORMAL
PATH REPORT.FINAL DX SPEC: NORMAL
PATH REPORT.GROSS SPEC: NORMAL
PATH REPORT.MICROSCOPIC SPEC OTHER STN: NORMAL
PATH REPORT.RELEVANT HX SPEC: NORMAL
PHOTO IMAGE: NORMAL

## 2025-06-04 RX ORDER — OXYCODONE HYDROCHLORIDE 5 MG/1
5 TABLET ORAL EVERY 6 HOURS PRN
Qty: 20 TABLET | Refills: 0 | Status: SHIPPED | OUTPATIENT
Start: 2025-06-04 | End: 2025-06-07

## 2025-06-04 NOTE — CONFIDENTIAL NOTE
[facility]  Patient call    S/p tay gomes by Dr. Savage on 6/2/2025. Patient is taking 2 Oxy every 4 hours. She has 6 pills left. She is taking Tylenol and Ibuprofen also. She has not had a BM since surgery.    A/P: Instructed patient to take 1,000 mg Tylenol and 600 mg Ibuprofen QID. Encouraged laxative. Refill Oxy. Instructed patient to decrease to 1 pill at a time.        Michelle Caal PA-C

## 2025-06-05 ENCOUNTER — RESULTS FOLLOW-UP (OUTPATIENT)
Dept: SURGERY | Facility: CLINIC | Age: 40
End: 2025-06-05

## 2025-06-26 ENCOUNTER — TELEPHONE (OUTPATIENT)
Dept: SURGERY | Facility: CLINIC | Age: 40
End: 2025-06-26
Payer: COMMERCIAL

## 2025-06-26 NOTE — TELEPHONE ENCOUNTER
Attempted to call patient for post op check. No answer.  A message was left for patient to call back if they had any questions or concerns.     Uday Zaragoza PA-C

## (undated) DEVICE — CONNECTOR STOPCOCK 3 WAY MALE LL HI-FLO MX9311L

## (undated) DEVICE — ENDO TROCAR SLEEVE KII Z-THREADED 05X100MM CTS02

## (undated) DEVICE — LINEN POUCH DBL 5427

## (undated) DEVICE — CATH CHOLANGIOGRAM 4.5FR TAUT METAL TIP 20018-M55

## (undated) DEVICE — ESU CORD MONOPOLAR 10'  E0510

## (undated) DEVICE — TUBING IV EXTENSION SET ANESTHESIA 34" MLL 2C6227

## (undated) DEVICE — SYR 50ML LL W/O NDL 309653

## (undated) DEVICE — CATH IV ANGIO INTRO 12GA 382277

## (undated) DEVICE — LINEN HALF SHEET 5512

## (undated) DEVICE — SU VICRYL+ 4-0 18IN P-3 UND VCP494G

## (undated) DEVICE — SOLUTION IV IRRIGATION 0.9% NACL 3L R8206

## (undated) DEVICE — ENDO POUCH UNIV RETRIEVAL SYSTEM INZII 10MM CD001

## (undated) DEVICE — ENDO TROCAR FIRST ENTRY KII FIOS Z-THRD 05X100MM CTF03

## (undated) DEVICE — BAG CLEAR TRASH 1.3M 39X33" P4040C

## (undated) DEVICE — ENDO TROCAR OPTICAL ACCESS KII Z-THRD 12X100MM C0R85

## (undated) DEVICE — LINEN FULL SHEET 5511

## (undated) DEVICE — SUCTION IRR STRYKERFLOW II W/TIP 250-070-520

## (undated) DEVICE — SYR 30ML LL W/O NDL 302832

## (undated) DEVICE — Device

## (undated) DEVICE — ESU PENCIL W/HOLSTER E2350H

## (undated) DEVICE — RAD RX ISOVUE 300 (50ML) 61% IOPAMIDOL CHARGE PER ML

## (undated) DEVICE — ESU ELEC BLADE 2.75" COATED/INSULATED E1455

## (undated) DEVICE — SUTURE VICRYL+ 0 CT-2 18" VCP727H

## (undated) DEVICE — LINEN TOWEL PACK X5 5464

## (undated) DEVICE — ESU GROUND PAD ADULT W/CORD E7507

## (undated) DEVICE — PREP CHLORAPREP 26ML TINTED HI-LITE ORANGE 930815

## (undated) DEVICE — VIAL DECANTER STERILE WHITE DYNJDEC06

## (undated) RX ORDER — ONDANSETRON 2 MG/ML
INJECTION INTRAMUSCULAR; INTRAVENOUS
Status: DISPENSED
Start: 2025-06-01

## (undated) RX ORDER — GLYCOPYRROLATE 0.2 MG/ML
INJECTION, SOLUTION INTRAMUSCULAR; INTRAVENOUS
Status: DISPENSED
Start: 2025-06-01

## (undated) RX ORDER — LIDOCAINE HYDROCHLORIDE 10 MG/ML
INJECTION, SOLUTION EPIDURAL; INFILTRATION; INTRACAUDAL; PERINEURAL
Status: DISPENSED
Start: 2025-06-01

## (undated) RX ORDER — DEXAMETHASONE SODIUM PHOSPHATE 4 MG/ML
INJECTION, SOLUTION INTRA-ARTICULAR; INTRALESIONAL; INTRAMUSCULAR; INTRAVENOUS; SOFT TISSUE
Status: DISPENSED
Start: 2025-06-01

## (undated) RX ORDER — BUPIVACAINE HYDROCHLORIDE AND EPINEPHRINE 5; 5 MG/ML; UG/ML
INJECTION, SOLUTION EPIDURAL; INTRACAUDAL; PERINEURAL
Status: DISPENSED
Start: 2025-06-01

## (undated) RX ORDER — METHADONE HYDROCHLORIDE 10 MG/ML
INJECTION, SOLUTION INTRAMUSCULAR; INTRAVENOUS; SUBCUTANEOUS
Status: DISPENSED
Start: 2025-06-01

## (undated) RX ORDER — INDOCYANINE GREEN AND WATER 25 MG
KIT INJECTION
Status: DISPENSED
Start: 2025-06-01

## (undated) RX ORDER — EPHEDRINE SULFATE 50 MG/ML
INJECTION, SOLUTION INTRAMUSCULAR; INTRAVENOUS; SUBCUTANEOUS
Status: DISPENSED
Start: 2025-06-01

## (undated) RX ORDER — KETOROLAC TROMETHAMINE 30 MG/ML
INJECTION, SOLUTION INTRAMUSCULAR; INTRAVENOUS
Status: DISPENSED
Start: 2025-06-01